# Patient Record
Sex: FEMALE | Race: WHITE | NOT HISPANIC OR LATINO | Employment: OTHER | URBAN - METROPOLITAN AREA
[De-identification: names, ages, dates, MRNs, and addresses within clinical notes are randomized per-mention and may not be internally consistent; named-entity substitution may affect disease eponyms.]

---

## 2023-03-28 DIAGNOSIS — F41.9 ANXIETY: ICD-10-CM

## 2023-03-28 RX ORDER — CLONAZEPAM 0.5 MG/1
1 TABLET ORAL DAILY
COMMUNITY
Start: 2015-05-22 | End: 2023-03-28 | Stop reason: SDUPTHER

## 2023-03-28 RX ORDER — CLONAZEPAM 0.5 MG/1
0.5 TABLET ORAL DAILY
Qty: 7 TABLET | Refills: 0 | Status: SHIPPED | OUTPATIENT
Start: 2023-03-28 | End: 2023-04-07 | Stop reason: SDUPTHER

## 2023-04-06 PROBLEM — R91.8 MULTIPLE PULMONARY NODULES: Status: ACTIVE | Noted: 2023-04-06

## 2023-04-06 PROBLEM — F32.0 DEPRESSION, MAJOR, SINGLE EPISODE, MILD (CMS-HCC): Status: ACTIVE | Noted: 2023-04-06

## 2023-04-06 PROBLEM — L40.9 PSORIASIS: Status: ACTIVE | Noted: 2023-04-06

## 2023-04-06 PROBLEM — M54.17 LUMBOSACRAL NEURITIS: Status: ACTIVE | Noted: 2023-04-06

## 2023-04-06 PROBLEM — K57.32 DIVERTICULITIS OF COLON: Status: ACTIVE | Noted: 2023-04-06

## 2023-04-06 PROBLEM — K63.5 COLON POLYP: Status: ACTIVE | Noted: 2023-04-06

## 2023-04-06 PROBLEM — F41.9 ANXIETY DISORDER: Status: ACTIVE | Noted: 2023-04-06

## 2023-04-06 PROBLEM — J45.901 ASTHMA EXACERBATION (HHS-HCC): Status: ACTIVE | Noted: 2023-04-06

## 2023-04-06 PROBLEM — G43.909 MIGRAINE: Status: ACTIVE | Noted: 2023-04-06

## 2023-04-06 PROBLEM — E78.00 PURE HYPERCHOLESTEROLEMIA: Status: ACTIVE | Noted: 2023-04-06

## 2023-04-06 PROBLEM — K58.9 IBS (IRRITABLE BOWEL SYNDROME): Status: ACTIVE | Noted: 2023-04-06

## 2023-04-06 PROBLEM — I77.71 CAROTID ARTERY DISSECTION (MULTI): Status: ACTIVE | Noted: 2023-04-06

## 2023-04-06 PROBLEM — I77.6 ARTERITIS (CMS-HCC): Status: ACTIVE | Noted: 2023-04-06

## 2023-04-06 PROBLEM — E78.5 HYPERLIPIDEMIA: Status: ACTIVE | Noted: 2023-04-06

## 2023-04-06 PROBLEM — M51.9 LUMBAR DISC DISEASE: Status: ACTIVE | Noted: 2023-04-06

## 2023-04-06 PROBLEM — M54.2 CERVICALGIA: Status: ACTIVE | Noted: 2023-04-06

## 2023-04-06 RX ORDER — NADOLOL 20 MG/1
1 TABLET ORAL DAILY
COMMUNITY
Start: 2015-11-30

## 2023-04-06 RX ORDER — ESTRADIOL 10 UG/1
INSERT VAGINAL
COMMUNITY
Start: 2016-02-05 | End: 2024-05-20 | Stop reason: ALTCHOICE

## 2023-04-06 RX ORDER — FAMOTIDINE 20 MG/1
1 TABLET, FILM COATED ORAL 3 TIMES DAILY
COMMUNITY
Start: 2021-12-01

## 2023-04-06 RX ORDER — TIZANIDINE 2 MG/1
1 TABLET ORAL EVERY 6 HOURS PRN
COMMUNITY
Start: 2016-02-25 | End: 2023-10-12 | Stop reason: ALTCHOICE

## 2023-04-06 RX ORDER — SUMATRIPTAN SUCCINATE 25 MG/1
1 TABLET ORAL
COMMUNITY
Start: 2015-06-11 | End: 2023-10-30

## 2023-04-06 RX ORDER — CLOBETASOL PROPIONATE 0.46 MG/ML
1 SOLUTION TOPICAL 2 TIMES DAILY
COMMUNITY
Start: 2020-09-09

## 2023-04-06 RX ORDER — ASPIRIN 81 MG/1
TABLET ORAL
COMMUNITY
End: 2023-12-07 | Stop reason: ALTCHOICE

## 2023-04-06 RX ORDER — AMITRIPTYLINE HYDROCHLORIDE 10 MG/1
1 TABLET, FILM COATED ORAL DAILY
COMMUNITY
Start: 2022-06-09 | End: 2023-10-12

## 2023-04-06 RX ORDER — PRAVASTATIN SODIUM 20 MG/1
1 TABLET ORAL DAILY
COMMUNITY
Start: 2021-10-04 | End: 2023-07-31

## 2023-04-07 ENCOUNTER — OFFICE VISIT (OUTPATIENT)
Dept: PRIMARY CARE | Facility: CLINIC | Age: 68
End: 2023-04-07
Payer: MEDICARE

## 2023-04-07 VITALS
WEIGHT: 128.4 LBS | SYSTOLIC BLOOD PRESSURE: 118 MMHG | HEART RATE: 100 BPM | BODY MASS INDEX: 21.92 KG/M2 | HEIGHT: 64 IN | RESPIRATION RATE: 18 BRPM | DIASTOLIC BLOOD PRESSURE: 76 MMHG | TEMPERATURE: 97.2 F

## 2023-04-07 DIAGNOSIS — I77.71 CAROTID ARTERY DISSECTION (MULTI): ICD-10-CM

## 2023-04-07 DIAGNOSIS — F32.0 DEPRESSION, MAJOR, SINGLE EPISODE, MILD (CMS-HCC): ICD-10-CM

## 2023-04-07 DIAGNOSIS — F41.9 ANXIETY: ICD-10-CM

## 2023-04-07 DIAGNOSIS — Z79.899 HIGH RISK MEDICATION USE: Primary | ICD-10-CM

## 2023-04-07 PROCEDURE — 1036F TOBACCO NON-USER: CPT | Performed by: FAMILY MEDICINE

## 2023-04-07 PROCEDURE — 1159F MED LIST DOCD IN RCRD: CPT | Performed by: FAMILY MEDICINE

## 2023-04-07 PROCEDURE — 80307 DRUG TEST PRSMV CHEM ANLYZR: CPT

## 2023-04-07 PROCEDURE — 99213 OFFICE O/P EST LOW 20 MIN: CPT | Performed by: FAMILY MEDICINE

## 2023-04-07 PROCEDURE — 80354 DRUG SCREENING FENTANYL: CPT

## 2023-04-07 PROCEDURE — 80368 SEDATIVE HYPNOTICS: CPT

## 2023-04-07 PROCEDURE — 80373 DRUG SCREENING TRAMADOL: CPT

## 2023-04-07 PROCEDURE — 80346 BENZODIAZEPINES1-12: CPT

## 2023-04-07 PROCEDURE — 80358 DRUG SCREENING METHADONE: CPT

## 2023-04-07 PROCEDURE — 80365 DRUG SCREENING OXYCODONE: CPT

## 2023-04-07 PROCEDURE — 80361 OPIATES 1 OR MORE: CPT

## 2023-04-07 RX ORDER — CLONAZEPAM 0.5 MG/1
0.5 TABLET ORAL DAILY
Qty: 30 TABLET | Refills: 0 | Status: SHIPPED | OUTPATIENT
Start: 2023-04-07 | End: 2023-05-11 | Stop reason: SDUPTHER

## 2023-04-07 RX ORDER — SERTRALINE HYDROCHLORIDE 25 MG/1
50 TABLET, FILM COATED ORAL DAILY
COMMUNITY

## 2023-04-07 ASSESSMENT — PATIENT HEALTH QUESTIONNAIRE - PHQ9
5. POOR APPETITE OR OVEREATING: NOT AT ALL
3. TROUBLE FALLING OR STAYING ASLEEP: NOT AT ALL
10. IF YOU CHECKED OFF ANY PROBLEMS, HOW DIFFICULT HAVE THESE PROBLEMS MADE IT FOR YOU TO DO YOUR WORK, TAKE CARE OF THINGS AT HOME, OR GET ALONG WITH OTHER PEOPLE: SOMEWHAT DIFFICULT
4. FEELING TIRED OR HAVING LITTLE ENERGY: MORE THAN HALF THE DAYS
6. FEELING BAD ABOUT YOURSELF - OR THAT YOU ARE A FAILURE OR HAVE LET YOURSELF OR YOUR FAMILY DOWN: NOT AT ALL
9. THOUGHTS THAT YOU WOULD BE BETTER OFF DEAD, OR OF HURTING YOURSELF: NOT AT ALL
SUM OF ALL RESPONSES TO PHQ QUESTIONS 1-9: 7
SUM OF ALL RESPONSES TO PHQ9 QUESTIONS 1 & 2: 5
1. LITTLE INTEREST OR PLEASURE IN DOING THINGS: MORE THAN HALF THE DAYS
8. MOVING OR SPEAKING SO SLOWLY THAT OTHER PEOPLE COULD HAVE NOTICED. OR THE OPPOSITE, BEING SO FIGETY OR RESTLESS THAT YOU HAVE BEEN MOVING AROUND A LOT MORE THAN USUAL: NOT AT ALL
7. TROUBLE CONCENTRATING ON THINGS, SUCH AS READING THE NEWSPAPER OR WATCHING TELEVISION: NOT AT ALL
2. FEELING DOWN, DEPRESSED OR HOPELESS: NEARLY EVERY DAY

## 2023-04-07 NOTE — PROGRESS NOTES
Subjective   Patient ID: Marichuy Guajardo is a 67 y.o. female who presents for Med Refill (Clonazepam last dose 4/6/23  need csa need order for urine tox  pt is currently on clonazepam not current in med list).    Having some abdominal pain saw Hussein   Going to [facility] for functional medicine   started on sertraline 5 days ago from psychiatrist     TALI:  Opal Tyler MD on 4/7/2023  9:24 AM  I have personally reviewed the OARRS report for Marichuy Guajardo. I have considered the risks of abuse, dependence, addiction and diversion    Is the patient prescribed a combination of a benzodiazepine and opioid?  No    Last Urine Drug Screen / ordered today: Yes  Recent Results (from the past 16437 hour(s))   OPIATE/OPIOID/BENZO PRESCRIPTION COMPLIANCE    Collection Time: 04/06/22 12:00 AM   Result Value Ref Range    DRUG SCREEN COMMENT URINE SEE BELOW     Creatine, Urine 164.9 mg/dL    Amphetamine Screen, Urine PRESUMPTIVE NEGATIVE NEGATIVE    Barbiturate Screen, Urine PRESUMPTIVE NEGATIVE NEGATIVE    Cannabinoid Screen, Urine PRESUMPTIVE NEGATIVE NEGATIVE    Cocaine Screen, Urine PRESUMPTIVE NEGATIVE NEGATIVE    PCP Screen, Urine PRESUMPTIVE NEGATIVE NEGATIVE    7-Aminoclonazepam 177 (A) Cutoff <25 ng/mL    Alpha-Hydroxyalprazolam <25 Cutoff <25 ng/mL    Alpha-Hydroxymidazolam <25 Cutoff <25 ng/mL    Alprazolam <25 Cutoff <25 ng/mL    Chlordiazepoxide <25 Cutoff <25 ng/mL    Clonazepam <25 Cutoff <25 ng/mL    Diazepam <25 Cutoff <25 ng/mL    Lorazepam <25 Cutoff <25 ng/mL    Midazolam <25 Cutoff <25 ng/mL    Nordiazepam <25 Cutoff <25 ng/mL    Oxazepam <25 Cutoff <25 ng/mL    Temazepam <25 Cutoff <25 ng/mL    Zolpidem <25 Cutoff <25 ng/mL    Zolpidem Metabolite (ZCA) <25 Cutoff <25 ng/mL    6-Acetylmorphine <25 Cutoff <25 ng/mL    Codeine <50 Cutoff <50 ng/mL    Hydrocodone <25 Cutoff <25 ng/mL    Hydromorphone <25 Cutoff <25 ng/mL    Morphine Urine <50 Cutoff <50 ng/mL    Norhydrocodone <25 Cutoff <25 ng/mL     "Noroxycodone <25 Cutoff <25 ng/mL    Oxycodone <25 Cutoff <25 ng/mL    Oxymorphone <25 Cutoff <25 ng/mL    Tramadol <50 Cutoff <50 ng/mL    O-Desmethyltramadol <50 Cutoff <50 ng/mL    Fentanyl <2.5 Cutoff<2.5 ng/mL    Norfentanyl <2.5 Cutoff<2.5 ng/mL    METHADONE CONFIRMATION,URINE <25 Cutoff <25 ng/mL    EDDP <25 Cutoff <25 ng/mL     Results are as expected.     Controlled Substance Agreement:  Date of the Last Agreement: today  Reviewed Controlled Substance Agreement including but not limited to the benefits, risks, and alternatives to treatment with a Controlled Substance medication(s).    Benzodiazepines:  What is the patient's goal of therapy? anxiety  Is this being achieved with current treatment? yes    MARKUS-7:  No data recorded    Activities of Daily Living:   Is your overall impression that this patient is benefiting (symptom reduction outweighs side effects) from benzodiazepine therapy? Yes     1. Physical Functioning: Better  2. Family Relationship: Better  3. Social Relationship: Better  4. Mood: Better  5. Sleep Patterns: Better  6. Overall Function: Better     Review of Systems    Objective   /76 (BP Location: Right arm, Patient Position: Sitting, BP Cuff Size: Adult)   Pulse 100   Temp 36.2 °C (97.2 °F) (Temporal)   Resp 18   Ht 1.626 m (5' 4\")   Wt 58.2 kg (128 lb 6.4 oz)   BMI 22.04 kg/m²     Physical Exam  Vitals and nursing note reviewed.   Constitutional:       General: She is not in acute distress.     Appearance: She is not ill-appearing.   HENT:      Head: Normocephalic and atraumatic.      Mouth/Throat:      Mouth: Mucous membranes are moist.   Eyes:      Conjunctiva/sclera: Conjunctivae normal.   Cardiovascular:      Rate and Rhythm: Normal rate and regular rhythm.      Heart sounds: Normal heart sounds.   Pulmonary:      Effort: Pulmonary effort is normal.      Breath sounds: Normal breath sounds.   Skin:     General: Skin is warm.   Neurological:      Mental Status: She is " alert.   Psychiatric:         Mood and Affect: Mood normal.         Thought Content: Thought content normal.         Judgment: Judgment normal.         Assessment/Plan   Problem List Items Addressed This Visit          Circulatory    Carotid artery dissection (CMS/HCC)     Stable and will monitor at least yearly.             Other    Depression, major, single episode, mild (CMS/HCC)     Started on sertraline by psych          Other Visit Diagnoses       High risk medication use    -  Primary    Relevant Orders    OPIATE/OPIOID/BENZO PRESCRIPTION COMPLIANCE    Anxiety        Relevant Medications    clonazePAM (KlonoPIN) 0.5 mg tablet    Other Relevant Orders    Follow Up In [facility] - PCP

## 2023-04-11 LAB
6-ACETYLMORPHINE: <25 NG/ML
7-AMINOCLONAZEPAM: 394 NG/ML
ALPHA-HYDROXYALPRAZOLAM: <25 NG/ML
ALPHA-HYDROXYMIDAZOLAM: <25 NG/ML
ALPRAZOLAM: <25 NG/ML
AMPHETAMINE (PRESENCE) IN URINE BY SCREEN METHOD: ABNORMAL
BARBITURATES PRESENCE IN URINE BY SCREEN METHOD: ABNORMAL
CANNABINOIDS IN URINE BY SCREEN METHOD: ABNORMAL
CHLORDIAZEPOXIDE: <25 NG/ML
CLONAZEPAM: <25 NG/ML
COCAINE (PRESENCE) IN URINE BY SCREEN METHOD: ABNORMAL
CODEINE: <50 NG/ML
CREATINE, URINE FOR DRUG: 286.5 MG/DL
DIAZEPAM: <25 NG/ML
DRUG SCREEN COMMENT URINE: ABNORMAL
EDDP: <25 NG/ML
FENTANYL CONFIRMATION, URINE: <2.5 NG/ML
HYDROCODONE: <25 NG/ML
HYDROMORPHONE: <25 NG/ML
LORAZEPAM: <25 NG/ML
METHADONE CONFIRMATION,URINE: <25 NG/ML
MIDAZOLAM: <25 NG/ML
MORPHINE URINE: <50 NG/ML
NORDIAZEPAM: <25 NG/ML
NORFENTANYL: <2.5 NG/ML
NORHYDROCODONE: <25 NG/ML
NOROXYCODONE: <25 NG/ML
O-DESMETHYLTRAMADOL: <50 NG/ML
OXAZEPAM: <25 NG/ML
OXYCODONE: <25 NG/ML
OXYMORPHONE: <25 NG/ML
PHENCYCLIDINE (PRESENCE) IN URINE BY SCREEN METHOD: ABNORMAL
TEMAZEPAM: <25 NG/ML
TRAMADOL: <50 NG/ML
ZOLPIDEM METABOLITE (ZCA): <25 NG/ML
ZOLPIDEM: <25 NG/ML

## 2023-05-11 DIAGNOSIS — F41.9 ANXIETY: ICD-10-CM

## 2023-05-11 RX ORDER — CLONAZEPAM 0.5 MG/1
0.5 TABLET ORAL DAILY
Qty: 30 TABLET | Refills: 0 | Status: SHIPPED | OUTPATIENT
Start: 2023-05-11 | End: 2023-06-23

## 2023-06-23 DIAGNOSIS — F41.9 ANXIETY: ICD-10-CM

## 2023-06-23 RX ORDER — CLONAZEPAM 0.5 MG/1
TABLET ORAL
Qty: 30 TABLET | Refills: 0 | Status: SHIPPED | OUTPATIENT
Start: 2023-06-23 | End: 2023-07-28 | Stop reason: SDUPTHER

## 2023-07-11 ENCOUNTER — OFFICE VISIT (OUTPATIENT)
Dept: PRIMARY CARE | Facility: CLINIC | Age: 68
End: 2023-07-11
Payer: MEDICARE

## 2023-07-11 VITALS
BODY MASS INDEX: 22.23 KG/M2 | HEIGHT: 64 IN | SYSTOLIC BLOOD PRESSURE: 122 MMHG | WEIGHT: 130.2 LBS | RESPIRATION RATE: 16 BRPM | DIASTOLIC BLOOD PRESSURE: 72 MMHG | HEART RATE: 74 BPM | TEMPERATURE: 97.3 F

## 2023-07-11 DIAGNOSIS — F41.9 ANXIETY: ICD-10-CM

## 2023-07-11 DIAGNOSIS — E78.5 HYPERLIPIDEMIA, UNSPECIFIED HYPERLIPIDEMIA TYPE: Primary | ICD-10-CM

## 2023-07-11 PROCEDURE — 1160F RVW MEDS BY RX/DR IN RCRD: CPT | Performed by: FAMILY MEDICINE

## 2023-07-11 PROCEDURE — 1036F TOBACCO NON-USER: CPT | Performed by: FAMILY MEDICINE

## 2023-07-11 PROCEDURE — 1126F AMNT PAIN NOTED NONE PRSNT: CPT | Performed by: FAMILY MEDICINE

## 2023-07-11 PROCEDURE — 99213 OFFICE O/P EST LOW 20 MIN: CPT | Performed by: FAMILY MEDICINE

## 2023-07-11 PROCEDURE — 1159F MED LIST DOCD IN RCRD: CPT | Performed by: FAMILY MEDICINE

## 2023-07-11 ASSESSMENT — ANXIETY QUESTIONNAIRES
1. FEELING NERVOUS, ANXIOUS, OR ON EDGE: MORE THAN HALF THE DAYS
6. BECOMING EASILY ANNOYED OR IRRITABLE: NOT AT ALL
3. WORRYING TOO MUCH ABOUT DIFFERENT THINGS: SEVERAL DAYS
5. BEING SO RESTLESS THAT IT IS HARD TO SIT STILL: NOT AT ALL
7. FEELING AFRAID AS IF SOMETHING AWFUL MIGHT HAPPEN: MORE THAN HALF THE DAYS
4. TROUBLE RELAXING: MORE THAN HALF THE DAYS
GAD7 TOTAL SCORE: 9
IF YOU CHECKED OFF ANY PROBLEMS ON THIS QUESTIONNAIRE, HOW DIFFICULT HAVE THESE PROBLEMS MADE IT FOR YOU TO DO YOUR WORK, TAKE CARE OF THINGS AT HOME, OR GET ALONG WITH OTHER PEOPLE: SOMEWHAT DIFFICULT
2. NOT BEING ABLE TO STOP OR CONTROL WORRYING: MORE THAN HALF THE DAYS

## 2023-07-11 NOTE — PROGRESS NOTES
Subjective   Patient ID: Marichuy Guajardo is a 67 y.o. female who presents for Follow-up (Patient here for 3 month med check for Clonazepam Last Tox was 4/2023. Last dose of Clonazepam was last night 7/10/23 ).    Seeing a psychiatrist for sertraline   Miralax helping with constipation.  Seeing neurology for migraines   [facility] in March    [facility]  Opal Tyler MD on 7/11/2023 10:08 AM  I have personally reviewed the OARRS report for Marichuy Guajardo. I have considered the risks of abuse, dependence, addiction and diversion    Is the patient prescribed a combination of a benzodiazepine and opioid?  No    Last Urine Drug Screen / ordered today: No  Recent Results (from the past 02611 hour(s))   OPIATE/OPIOID/BENZO PRESCRIPTION COMPLIANCE    Collection Time: 04/07/23 11:21 AM   Result Value Ref Range    DRUG SCREEN COMMENT URINE SEE BELOW     Creatine, Urine 286.5 mg/dL    Amphetamine Screen, Urine PRESUMPTIVE NEGATIVE NEGATIVE    Barbiturate Screen, Urine PRESUMPTIVE NEGATIVE NEGATIVE    Cannabinoid Screen, Urine PRESUMPTIVE NEGATIVE NEGATIVE    Cocaine Screen, Urine PRESUMPTIVE NEGATIVE NEGATIVE    PCP Screen, Urine PRESUMPTIVE NEGATIVE NEGATIVE    7-Aminoclonazepam 394 (A) Cutoff <25 ng/mL    Alpha-Hydroxyalprazolam <25 Cutoff <25 ng/mL    Alpha-Hydroxymidazolam <25 Cutoff <25 ng/mL    Alprazolam <25 Cutoff <25 ng/mL    Chlordiazepoxide <25 Cutoff <25 ng/mL    Clonazepam <25 Cutoff <25 ng/mL    Diazepam <25 Cutoff <25 ng/mL    Lorazepam <25 Cutoff <25 ng/mL    Midazolam <25 Cutoff <25 ng/mL    Nordiazepam <25 Cutoff <25 ng/mL    Oxazepam <25 Cutoff <25 ng/mL    Temazepam <25 Cutoff <25 ng/mL    Zolpidem <25 Cutoff <25 ng/mL    Zolpidem Metabolite (ZCA) <25 Cutoff <25 ng/mL    6-Acetylmorphine <25 Cutoff <25 ng/mL    Codeine <50 Cutoff <50 ng/mL    Hydrocodone <25 Cutoff <25 ng/mL    Hydromorphone <25 Cutoff <25 ng/mL    Morphine Urine <50 Cutoff <50 ng/mL    Norhydrocodone <25 Cutoff <25  "ng/mL    Noroxycodone <25 Cutoff <25 ng/mL    Oxycodone <25 Cutoff <25 ng/mL    Oxymorphone <25 Cutoff <25 ng/mL    Tramadol <50 Cutoff <50 ng/mL    O-Desmethyltramadol <50 Cutoff <50 ng/mL    Fentanyl <2.5 Cutoff<2.5 ng/mL    Norfentanyl <2.5 Cutoff<2.5 ng/mL    METHADONE CONFIRMATION,URINE <25 Cutoff <25 ng/mL    EDDP <25 Cutoff <25 ng/mL     Results are as expected.     Controlled Substance Agreement:  Date of the Last Agreement: today  Reviewed Controlled Substance Agreement including but not limited to the benefits, risks, and alternatives to treatment with a Controlled Substance medication(s).    Benzodiazepines:  What is the patient's goal of therapy? Sleep/anxiety  Is this being achieved with current treatment? yes    MARKUS-7:  Over the last 2 weeks, how often have you been bothered by any of the following problems?  Feeling nervous, anxious, or on edge: 2  Not being able to stop or control worryin  Worrying too much about different things: 1  Trouble relaxin  Being so restless that it is hard to sit still: 0  Becoming easily annoyed or irritable: 0  Feeling afraid as if something awful might happen: 2  MARKUS-7 Total Score: 9        Activities of Daily Living:   Is your overall impression that this patient is benefiting (symptom reduction outweighs side effects) from benzodiazepine therapy? Yes     1. Physical Functioning: Better  2. Family Relationship: Better  3. Social Relationship: Better  4. Mood: Better  5. Sleep Patterns: Better  6. Overall Function: Better     Review of Systems    Objective   /72   Pulse 74   Temp 36.3 °C (97.3 °F)   Resp 16   Ht 1.626 m (5' 4\")   Wt 59.1 kg (130 lb 3.2 oz)   BMI 22.35 kg/m²     Physical Exam  Vitals and nursing note reviewed.   Constitutional:       General: She is not in acute distress.     Appearance: She is not ill-appearing.   HENT:      Head: Normocephalic and atraumatic.      Mouth/Throat:      Mouth: Mucous membranes are moist.   Eyes:      " Conjunctiva/sclera: Conjunctivae normal.   Cardiovascular:      Rate and Rhythm: Normal rate and regular rhythm.      Heart sounds: Normal heart sounds.   Pulmonary:      Effort: Pulmonary effort is normal.      Breath sounds: Normal breath sounds.   Skin:     General: Skin is warm.   Neurological:      Mental Status: She is alert.   Psychiatric:         Mood and Affect: Mood normal.         Thought Content: Thought content normal.         Judgment: Judgment normal.         Assessment/Plan   Problem List Items Addressed This Visit       Hyperlipidemia - Primary    Relevant Orders    Lipid Panel    Hepatic Function Panel     Other Visit Diagnoses       Anxiety        Relevant Orders    Follow Up In [facility] - PCP - Established

## 2023-07-17 ENCOUNTER — LAB (OUTPATIENT)
Dept: LAB | Facility: LAB | Age: 68
End: 2023-07-17
Payer: MEDICARE

## 2023-07-17 DIAGNOSIS — E78.5 HYPERLIPIDEMIA, UNSPECIFIED HYPERLIPIDEMIA TYPE: ICD-10-CM

## 2023-07-17 LAB
ALANINE AMINOTRANSFERASE (SGPT) (U/L) IN SER/PLAS: 25 U/L (ref 7–45)
ALBUMIN (G/DL) IN SER/PLAS: 4.1 G/DL (ref 3.4–5)
ALKALINE PHOSPHATASE (U/L) IN SER/PLAS: 41 U/L (ref 33–136)
ASPARTATE AMINOTRANSFERASE (SGOT) (U/L) IN SER/PLAS: 22 U/L (ref 9–39)
BILIRUBIN DIRECT (MG/DL) IN SER/PLAS: 0.1 MG/DL (ref 0–0.3)
BILIRUBIN TOTAL (MG/DL) IN SER/PLAS: 0.4 MG/DL (ref 0–1.2)
CHOLESTEROL (MG/DL) IN SER/PLAS: 225 MG/DL (ref 0–199)
CHOLESTEROL IN HDL (MG/DL) IN SER/PLAS: 51.7 MG/DL
CHOLESTEROL/HDL RATIO: 4.4
LDL: 154 MG/DL (ref 0–99)
PROTEIN TOTAL: 6.7 G/DL (ref 6.4–8.2)
TRIGLYCERIDE (MG/DL) IN SER/PLAS: 99 MG/DL (ref 0–149)
VLDL: 20 MG/DL (ref 0–40)

## 2023-07-17 PROCEDURE — 80076 HEPATIC FUNCTION PANEL: CPT

## 2023-07-17 PROCEDURE — 80061 LIPID PANEL: CPT

## 2023-07-17 PROCEDURE — 36415 COLL VENOUS BLD VENIPUNCTURE: CPT

## 2023-07-28 DIAGNOSIS — F41.9 ANXIETY: ICD-10-CM

## 2023-07-28 RX ORDER — CLONAZEPAM 0.5 MG/1
0.5 TABLET ORAL DAILY
Qty: 30 TABLET | Refills: 0 | Status: SHIPPED | OUTPATIENT
Start: 2023-07-28 | End: 2023-09-18 | Stop reason: SDUPTHER

## 2023-07-29 DIAGNOSIS — E78.5 HYPERLIPIDEMIA, UNSPECIFIED HYPERLIPIDEMIA TYPE: Primary | ICD-10-CM

## 2023-07-31 RX ORDER — PRAVASTATIN SODIUM 20 MG/1
20 TABLET ORAL DAILY
Qty: 90 TABLET | Refills: 0 | Status: SHIPPED | OUTPATIENT
Start: 2023-07-31 | End: 2023-10-12 | Stop reason: ALTCHOICE

## 2023-08-31 ENCOUNTER — OFFICE VISIT (OUTPATIENT)
Dept: PRIMARY CARE | Facility: CLINIC | Age: 68
End: 2023-08-31
Payer: MEDICARE

## 2023-08-31 VITALS
RESPIRATION RATE: 16 BRPM | SYSTOLIC BLOOD PRESSURE: 118 MMHG | HEART RATE: 72 BPM | BODY MASS INDEX: 22.91 KG/M2 | HEIGHT: 64 IN | TEMPERATURE: 97.5 F | DIASTOLIC BLOOD PRESSURE: 72 MMHG | WEIGHT: 134.2 LBS

## 2023-08-31 DIAGNOSIS — J06.9 UPPER RESPIRATORY TRACT INFECTION, UNSPECIFIED TYPE: ICD-10-CM

## 2023-08-31 DIAGNOSIS — J02.9 SORE THROAT: Primary | ICD-10-CM

## 2023-08-31 LAB — POC RAPID STREP: NEGATIVE

## 2023-08-31 PROCEDURE — 87880 STREP A ASSAY W/OPTIC: CPT | Performed by: FAMILY MEDICINE

## 2023-08-31 PROCEDURE — 1159F MED LIST DOCD IN RCRD: CPT | Performed by: FAMILY MEDICINE

## 2023-08-31 PROCEDURE — 1126F AMNT PAIN NOTED NONE PRSNT: CPT | Performed by: FAMILY MEDICINE

## 2023-08-31 PROCEDURE — 99212 OFFICE O/P EST SF 10 MIN: CPT | Performed by: FAMILY MEDICINE

## 2023-08-31 PROCEDURE — 1036F TOBACCO NON-USER: CPT | Performed by: FAMILY MEDICINE

## 2023-08-31 PROCEDURE — 1160F RVW MEDS BY RX/DR IN RCRD: CPT | Performed by: FAMILY MEDICINE

## 2023-08-31 ASSESSMENT — ENCOUNTER SYMPTOMS
ABDOMINAL PAIN: 0
COUGH: 1
SORE THROAT: 1

## 2023-08-31 NOTE — PROGRESS NOTES
"Subjective   Patient ID: Marichuy Guajardo is a 67 y.o. female who presents for Sore Throat (Scratchy throat when she swallows. Some nasal congestion no color in mucus. ) and Earache (Symptoms started yesterday. Left ear pain ).    Pain left ear when swallow    slightly nasal congestion   Covid negative     Sore Throat   This is a new problem. The current episode started yesterday. The problem has been waxing and waning. There has been no fever. Associated symptoms include congestion, coughing (slight) and ear pain. Pertinent negatives include no abdominal pain.        Review of Systems   HENT:  Positive for congestion, ear pain and sore throat.    Respiratory:  Positive for cough (slight).    Gastrointestinal:  Negative for abdominal pain.       Objective   /72   Pulse 72   Temp 36.4 °C (97.5 °F)   Resp 16   Ht 1.626 m (5' 4\")   Wt 60.9 kg (134 lb 3.2 oz)   BMI 23.04 kg/m²     Physical Exam  Vitals and nursing note reviewed.   Constitutional:       General: She is not in acute distress.     Appearance: Normal appearance. She is not ill-appearing.   HENT:      Head: Normocephalic and atraumatic.      Right Ear: Tympanic membrane, ear canal and external ear normal. Tympanic membrane is not perforated, erythematous, retracted or bulging.      Left Ear: Tympanic membrane, ear canal and external ear normal. Tympanic membrane is not perforated, erythematous, retracted or bulging.      Mouth/Throat:      Mouth: Mucous membranes are moist.      Pharynx: Oropharynx is clear.   Eyes:      General:         Right eye: No hordeolum.         Left eye: No hordeolum.      Conjunctiva/sclera: Conjunctivae normal.      Right eye: Right conjunctiva is not injected. No exudate.     Left eye: Left conjunctiva is not injected. No exudate.  Cardiovascular:      Rate and Rhythm: Normal rate and regular rhythm.   Pulmonary:      Effort: Pulmonary effort is normal. No tachypnea.      Breath sounds: Normal breath sounds. No " decreased air movement.   Lymphadenopathy:      Cervical:      Right cervical: No superficial cervical adenopathy.     Left cervical: No superficial cervical adenopathy.   Skin:     General: Skin is warm.      Findings: No rash.   Neurological:      Mental Status: She is alert.   Psychiatric:         Mood and Affect: Mood and affect normal.         Assessment/Plan   Problem List Items Addressed This Visit    None  Visit Diagnoses       Sore throat    -  Primary    Relevant Orders    POCT rapid strep A manually resulted (Completed)    Upper respiratory tract infection, unspecified type

## 2023-09-18 DIAGNOSIS — F41.9 ANXIETY: ICD-10-CM

## 2023-09-18 RX ORDER — CLONAZEPAM 0.5 MG/1
0.5 TABLET ORAL DAILY
Qty: 30 TABLET | Refills: 0 | Status: SHIPPED | OUTPATIENT
Start: 2023-09-18 | End: 2023-10-12 | Stop reason: SDUPTHER

## 2023-10-12 ENCOUNTER — OFFICE VISIT (OUTPATIENT)
Dept: PRIMARY CARE | Facility: CLINIC | Age: 68
End: 2023-10-12
Payer: MEDICARE

## 2023-10-12 VITALS
SYSTOLIC BLOOD PRESSURE: 124 MMHG | RESPIRATION RATE: 16 BRPM | HEART RATE: 76 BPM | TEMPERATURE: 97.8 F | BODY MASS INDEX: 22.88 KG/M2 | HEIGHT: 64 IN | DIASTOLIC BLOOD PRESSURE: 76 MMHG | WEIGHT: 134 LBS

## 2023-10-12 DIAGNOSIS — Z23 IMMUNIZATION DUE: ICD-10-CM

## 2023-10-12 DIAGNOSIS — F41.9 ANXIETY: ICD-10-CM

## 2023-10-12 DIAGNOSIS — G43.909 MIGRAINE WITHOUT STATUS MIGRAINOSUS, NOT INTRACTABLE, UNSPECIFIED MIGRAINE TYPE: Primary | ICD-10-CM

## 2023-10-12 DIAGNOSIS — R53.83 FATIGUE, UNSPECIFIED TYPE: ICD-10-CM

## 2023-10-12 DIAGNOSIS — G47.19 EXCESSIVE DAYTIME SLEEPINESS: ICD-10-CM

## 2023-10-12 PROCEDURE — 99214 OFFICE O/P EST MOD 30 MIN: CPT | Performed by: FAMILY MEDICINE

## 2023-10-12 PROCEDURE — 1160F RVW MEDS BY RX/DR IN RCRD: CPT | Performed by: FAMILY MEDICINE

## 2023-10-12 PROCEDURE — 90677 PCV20 VACCINE IM: CPT | Performed by: FAMILY MEDICINE

## 2023-10-12 PROCEDURE — 1159F MED LIST DOCD IN RCRD: CPT | Performed by: FAMILY MEDICINE

## 2023-10-12 PROCEDURE — G0009 ADMIN PNEUMOCOCCAL VACCINE: HCPCS | Performed by: FAMILY MEDICINE

## 2023-10-12 PROCEDURE — 1036F TOBACCO NON-USER: CPT | Performed by: FAMILY MEDICINE

## 2023-10-12 PROCEDURE — 1126F AMNT PAIN NOTED NONE PRSNT: CPT | Performed by: FAMILY MEDICINE

## 2023-10-12 RX ORDER — NORTRIPTYLINE HYDROCHLORIDE 10 MG/1
10 CAPSULE ORAL NIGHTLY
Qty: 30 CAPSULE | Refills: 2 | Status: SHIPPED | OUTPATIENT
Start: 2023-10-12 | End: 2023-12-07 | Stop reason: ALTCHOICE

## 2023-10-12 RX ORDER — CLONAZEPAM 0.5 MG/1
0.5 TABLET ORAL DAILY
Qty: 30 TABLET | Refills: 0 | Status: SHIPPED | OUTPATIENT
Start: 2023-10-12 | End: 2023-11-28 | Stop reason: SDUPTHER

## 2023-10-12 RX ORDER — ATORVASTATIN CALCIUM 40 MG/1
1 TABLET, FILM COATED ORAL DAILY
COMMUNITY
Start: 2023-09-13 | End: 2023-11-07 | Stop reason: SDUPTHER

## 2023-10-12 ASSESSMENT — ANXIETY QUESTIONNAIRES
IF YOU CHECKED OFF ANY PROBLEMS ON THIS QUESTIONNAIRE, HOW DIFFICULT HAVE THESE PROBLEMS MADE IT FOR YOU TO DO YOUR WORK, TAKE CARE OF THINGS AT HOME, OR GET ALONG WITH OTHER PEOPLE: NOT DIFFICULT AT ALL
3. WORRYING TOO MUCH ABOUT DIFFERENT THINGS: NOT AT ALL
2. NOT BEING ABLE TO STOP OR CONTROL WORRYING: MORE THAN HALF THE DAYS
1. FEELING NERVOUS, ANXIOUS, OR ON EDGE: NOT AT ALL
7. FEELING AFRAID AS IF SOMETHING AWFUL MIGHT HAPPEN: NOT AT ALL
6. BECOMING EASILY ANNOYED OR IRRITABLE: NOT AT ALL
GAD7 TOTAL SCORE: 2
5. BEING SO RESTLESS THAT IT IS HARD TO SIT STILL: NOT AT ALL
4. TROUBLE RELAXING: NOT AT ALL

## 2023-10-12 NOTE — PROGRESS NOTES
OARRS:  Opal Tyler MD on 10/12/2023 10:34 AM  I have personally reviewed the OARRS report for Marichuy Guajardo. I have considered the risks of abuse, dependence, addiction and diversion    Is the patient prescribed a combination of a benzodiazepine and opioid?  No    Last Urine Drug Screen / ordered today: No  Recent Results (from the past 8760 hour(s))   OPIATE/OPIOID/BENZO PRESCRIPTION COMPLIANCE    Collection Time: 04/07/23 11:21 AM   Result Value Ref Range    DRUG SCREEN COMMENT URINE SEE BELOW     Creatine, Urine 286.5 mg/dL    Amphetamine Screen, Urine PRESUMPTIVE NEGATIVE NEGATIVE    Barbiturate Screen, Urine PRESUMPTIVE NEGATIVE NEGATIVE    Cannabinoid Screen, Urine PRESUMPTIVE NEGATIVE NEGATIVE    Cocaine Screen, Urine PRESUMPTIVE NEGATIVE NEGATIVE    PCP Screen, Urine PRESUMPTIVE NEGATIVE NEGATIVE    7-Aminoclonazepam 394 (A) Cutoff <25 ng/mL    Alpha-Hydroxyalprazolam <25 Cutoff <25 ng/mL    Alpha-Hydroxymidazolam <25 Cutoff <25 ng/mL    Alprazolam <25 Cutoff <25 ng/mL    Chlordiazepoxide <25 Cutoff <25 ng/mL    Clonazepam <25 Cutoff <25 ng/mL    Diazepam <25 Cutoff <25 ng/mL    Lorazepam <25 Cutoff <25 ng/mL    Midazolam <25 Cutoff <25 ng/mL    Nordiazepam <25 Cutoff <25 ng/mL    Oxazepam <25 Cutoff <25 ng/mL    Temazepam <25 Cutoff <25 ng/mL    Zolpidem <25 Cutoff <25 ng/mL    Zolpidem Metabolite (ZCA) <25 Cutoff <25 ng/mL    6-Acetylmorphine <25 Cutoff <25 ng/mL    Codeine <50 Cutoff <50 ng/mL    Hydrocodone <25 Cutoff <25 ng/mL    Hydromorphone <25 Cutoff <25 ng/mL    Morphine Urine <50 Cutoff <50 ng/mL    Norhydrocodone <25 Cutoff <25 ng/mL    Noroxycodone <25 Cutoff <25 ng/mL    Oxycodone <25 Cutoff <25 ng/mL    Oxymorphone <25 Cutoff <25 ng/mL    Tramadol <50 Cutoff <50 ng/mL    O-Desmethyltramadol <50 Cutoff <50 ng/mL    Fentanyl <2.5 Cutoff<2.5 ng/mL    Norfentanyl <2.5 Cutoff<2.5 ng/mL    METHADONE CONFIRMATION,URINE <25 Cutoff <25 ng/mL    EDDP <25 Cutoff <25 ng/mL     Results are as  "expected.         Controlled Substance Agreement:  Date of the Last Agreement: 2023  Reviewed Controlled Substance Agreement including but not limited to the benefits, risks, and alternatives to treatment with a Controlled Substance medication(s).    Benzodiazepines:  What is the patient's goal of therapy? Anxiety   Is this being achieved with current treatment? yes    MARKUS-7:  Over the last 2 weeks, how often have you been bothered by any of the following problems?  Feeling nervous, anxious, or on edge: 0  Not being able to stop or control worryin  Worrying too much about different things: 0  Trouble relaxin  Being so restless that it is hard to sit still: 0  Becoming easily annoyed or irritable: 0  Feeling afraid as if something awful might happen: 0  MARKUS-7 Total Score: 2        Activities of Daily Living:   Is your overall impression that this patient is benefiting (symptom reduction outweighs side effects) from benzodiazepine therapy? Yes     1. Physical Functioning: Better  2. Family Relationship: Better  3. Social Relationship: Better  4. Mood: Better  5. Sleep Patterns: Better  6. Overall Function: BetterSubjective   Patient ID: Marichuy Guajardo is a 67 y.o. female who presents for Med Refill (Patient here for 3 month med check for Klonopin Last Tox screen and CSA was done 23  ) and Migraine (Daily Migraines for awhile now has been taking her Imitrex daily ).    Stopped Imitrex  and had headache for 2 days and IBS   Been more tired recently   snores  has excessive daytime sleepiness   Wants to try nortytyline for migraine prevention          Review of Systems    Objective   /76   Pulse 76   Temp 36.6 °C (97.8 °F)   Resp 16   Ht 1.626 m (5' 4\")   Wt 60.8 kg (134 lb)   BMI 23.00 kg/m²     Physical Exam  Vitals and nursing note reviewed.   Constitutional:       General: She is not in acute distress.     Appearance: She is not ill-appearing.   HENT:      Head: Normocephalic and " atraumatic.      Mouth/Throat:      Mouth: Mucous membranes are moist.   Eyes:      Conjunctiva/sclera: Conjunctivae normal.   Cardiovascular:      Rate and Rhythm: Normal rate and regular rhythm.      Heart sounds: Normal heart sounds.   Pulmonary:      Effort: Pulmonary effort is normal.      Breath sounds: Normal breath sounds.   Skin:     General: Skin is warm.   Neurological:      Mental Status: She is alert.   Psychiatric:         Mood and Affect: Mood normal.         Thought Content: Thought content normal.         Judgment: Judgment normal.         Assessment/Plan   Problem List Items Addressed This Visit             ICD-10-CM    Migraine - Primary G43.909    Relevant Medications    nortriptyline (Pamelor) 10 mg capsule    Other Relevant Orders    Follow Up In [facility] - PCP - Established     Other Visit Diagnoses         Codes    Anxiety     F41.9    Relevant Medications    clonazePAM (KlonoPIN) 0.5 mg tablet    Immunization due     Z23    Relevant Orders    Pneumococcal conjugate vaccine, 20-valent (PREVNAR 20) (Completed)    Fatigue, unspecified type     R53.83    Relevant Orders    CBC    Comprehensive Metabolic Panel    Vitamin B12    Thyroid Stimulating Hormone    Excessive daytime sleepiness     G47.19    Relevant Orders    Home sleep apnea test (HSAT)

## 2023-10-17 ENCOUNTER — LAB (OUTPATIENT)
Dept: LAB | Facility: LAB | Age: 68
End: 2023-10-17
Payer: MEDICARE

## 2023-10-17 DIAGNOSIS — R53.83 FATIGUE, UNSPECIFIED TYPE: ICD-10-CM

## 2023-10-17 LAB
ALBUMIN SERPL BCP-MCNC: 4.2 G/DL (ref 3.4–5)
ALP SERPL-CCNC: 43 U/L (ref 33–136)
ALT SERPL W P-5'-P-CCNC: 17 U/L (ref 7–45)
ANION GAP SERPL CALC-SCNC: 10 MMOL/L (ref 10–20)
AST SERPL W P-5'-P-CCNC: 18 U/L (ref 9–39)
BILIRUB SERPL-MCNC: 0.3 MG/DL (ref 0–1.2)
BUN SERPL-MCNC: 18 MG/DL (ref 6–23)
CALCIUM SERPL-MCNC: 9.6 MG/DL (ref 8.6–10.3)
CHLORIDE SERPL-SCNC: 104 MMOL/L (ref 98–107)
CO2 SERPL-SCNC: 30 MMOL/L (ref 21–32)
CREAT SERPL-MCNC: 0.89 MG/DL (ref 0.5–1.05)
ERYTHROCYTE [DISTWIDTH] IN BLOOD BY AUTOMATED COUNT: 12.7 % (ref 11.5–14.5)
GFR SERPL CREATININE-BSD FRML MDRD: 72 ML/MIN/1.73M*2
GLUCOSE SERPL-MCNC: 90 MG/DL (ref 74–99)
HCT VFR BLD AUTO: 41.9 % (ref 36–46)
HGB BLD-MCNC: 13.2 G/DL (ref 12–16)
MCH RBC QN AUTO: 29.1 PG (ref 26–34)
MCHC RBC AUTO-ENTMCNC: 31.5 G/DL (ref 32–36)
MCV RBC AUTO: 93 FL (ref 80–100)
NRBC BLD-RTO: 0 /100 WBCS (ref 0–0)
PLATELET # BLD AUTO: 283 X10*3/UL (ref 150–450)
PMV BLD AUTO: 10.7 FL (ref 7.5–11.5)
POTASSIUM SERPL-SCNC: 4.3 MMOL/L (ref 3.5–5.3)
PROT SERPL-MCNC: 7 G/DL (ref 6.4–8.2)
RBC # BLD AUTO: 4.53 X10*6/UL (ref 4–5.2)
SODIUM SERPL-SCNC: 140 MMOL/L (ref 136–145)
TSH SERPL-ACNC: 1.74 MIU/L (ref 0.44–3.98)
VIT B12 SERPL-MCNC: 524 PG/ML (ref 211–911)
WBC # BLD AUTO: 6 X10*3/UL (ref 4.4–11.3)

## 2023-10-17 PROCEDURE — 85027 COMPLETE CBC AUTOMATED: CPT

## 2023-10-17 PROCEDURE — 84443 ASSAY THYROID STIM HORMONE: CPT

## 2023-10-17 PROCEDURE — 80053 COMPREHEN METABOLIC PANEL: CPT

## 2023-10-17 PROCEDURE — 82607 VITAMIN B-12: CPT

## 2023-10-17 PROCEDURE — 36415 COLL VENOUS BLD VENIPUNCTURE: CPT

## 2023-10-29 DIAGNOSIS — G43.009 MIGRAINE WITHOUT AURA AND WITHOUT STATUS MIGRAINOSUS, NOT INTRACTABLE: Primary | ICD-10-CM

## 2023-10-30 RX ORDER — SUMATRIPTAN SUCCINATE 25 MG/1
TABLET ORAL
Qty: 9 TABLET | Refills: 0 | Status: SHIPPED | OUTPATIENT
Start: 2023-10-30 | End: 2023-11-28 | Stop reason: SDUPTHER

## 2023-11-07 ENCOUNTER — TELEPHONE (OUTPATIENT)
Dept: CARDIOLOGY | Facility: CLINIC | Age: 68
End: 2023-11-07

## 2023-11-07 ENCOUNTER — LAB (OUTPATIENT)
Dept: LAB | Facility: LAB | Age: 68
End: 2023-11-07
Payer: MEDICARE

## 2023-11-07 DIAGNOSIS — E78.5 HYPERLIPIDEMIA, UNSPECIFIED HYPERLIPIDEMIA TYPE: Primary | ICD-10-CM

## 2023-11-07 DIAGNOSIS — E78.5 HYPERLIPIDEMIA, UNSPECIFIED: Primary | ICD-10-CM

## 2023-11-07 LAB
CHOLEST SERPL-MCNC: 151 MG/DL (ref 0–199)
CHOLESTEROL/HDL RATIO: 3.2
HDLC SERPL-MCNC: 46.9 MG/DL
LDLC SERPL CALC-MCNC: 88 MG/DL
NON HDL CHOLESTEROL: 104 MG/DL (ref 0–149)
TRIGL SERPL-MCNC: 82 MG/DL (ref 0–149)
VLDL: 16 MG/DL (ref 0–40)

## 2023-11-07 PROCEDURE — 80061 LIPID PANEL: CPT

## 2023-11-07 PROCEDURE — 36415 COLL VENOUS BLD VENIPUNCTURE: CPT

## 2023-11-07 RX ORDER — ATORVASTATIN CALCIUM 40 MG/1
40 TABLET, FILM COATED ORAL DAILY
Qty: 90 TABLET | Refills: 3 | Status: SHIPPED | OUTPATIENT
Start: 2023-11-07 | End: 2023-12-07 | Stop reason: SINTOL

## 2023-11-07 NOTE — TELEPHONE ENCOUNTER
Marichuy having joint pain, calves throb, only taking atorvastatin 20 mg / lipid panel today is good .  Also having headaches      can stay off for a while ?

## 2023-11-09 ENCOUNTER — CLINICAL SUPPORT (OUTPATIENT)
Dept: SLEEP MEDICINE | Facility: HOSPITAL | Age: 68
End: 2023-11-09
Payer: MEDICARE

## 2023-11-09 DIAGNOSIS — G47.33 OBSTRUCTIVE SLEEP APNEA (ADULT) (PEDIATRIC): ICD-10-CM

## 2023-11-09 DIAGNOSIS — G47.19 EXCESSIVE DAYTIME SLEEPINESS: ICD-10-CM

## 2023-11-09 PROCEDURE — 95806 SLEEP STUDY UNATT&RESP EFFT: CPT | Performed by: SPECIALIST

## 2023-11-09 NOTE — PROGRESS NOTES
Type of Study: HOME SLEEP STUDY - NOMAD     The patient received equipment and instructions for use of the [facility] [facility] Nomad HSAT device. The patient was instructed how to apply the effort belts, cannula, thermistor. It was also explained how the Nomad and oximeter components work.  The patient was asked to record their sleep for an 8-hour period.     The patient was informed of their responsibility for the device and acknowledged this by signing the HSAT device contract. The patient was asked to return the device on 11/10/2023 between the hours of 9am to the [facility].     The patient was instructed to call 911 as usual for any medical- emergencies while at home.  The patient was also given a phone number for troubleshooting when using the device in case there were additional questions.

## 2023-11-13 ENCOUNTER — TELEPHONE (OUTPATIENT)
Dept: CARDIOLOGY | Facility: CLINIC | Age: 68
End: 2023-11-13
Payer: MEDICARE

## 2023-11-13 NOTE — TELEPHONE ENCOUNTER
Mrs. Greer calls back as requested after being off Atorvastatin for about a week.  She reports symptoms have gone away.      Please advise as to what's next, new med? Lower dose? See another doctor as previously mentioned?

## 2023-11-14 ENCOUNTER — TELEPHONE (OUTPATIENT)
Dept: CARDIOLOGY | Facility: CLINIC | Age: 68
End: 2023-11-14
Payer: MEDICARE

## 2023-11-14 NOTE — TELEPHONE ENCOUNTER
----- Message from Josh Velasco MD sent at 11/10/2023  4:11 PM EST -----  Please call the patient and let her know that the blood work looked good.  No changes needed.  Follow-up as scheduled

## 2023-11-15 DIAGNOSIS — G47.33 OSA (OBSTRUCTIVE SLEEP APNEA): Primary | ICD-10-CM

## 2023-11-28 DIAGNOSIS — G43.009 MIGRAINE WITHOUT AURA AND WITHOUT STATUS MIGRAINOSUS, NOT INTRACTABLE: ICD-10-CM

## 2023-11-28 DIAGNOSIS — F41.9 ANXIETY: ICD-10-CM

## 2023-11-28 RX ORDER — SUMATRIPTAN SUCCINATE 25 MG/1
25 TABLET ORAL ONCE AS NEEDED
Qty: 9 TABLET | Refills: 1 | Status: SHIPPED | OUTPATIENT
Start: 2023-11-28 | End: 2023-12-27 | Stop reason: SDUPTHER

## 2023-11-28 RX ORDER — CLONAZEPAM 0.5 MG/1
0.5 TABLET ORAL DAILY
Qty: 30 TABLET | Refills: 0 | Status: SHIPPED | OUTPATIENT
Start: 2023-11-28 | End: 2023-12-07 | Stop reason: SDUPTHER

## 2023-12-01 DIAGNOSIS — B00.1 RECURRENT COLD SORES: Primary | ICD-10-CM

## 2023-12-01 RX ORDER — ACYCLOVIR 800 MG/1
800 TABLET ORAL 3 TIMES DAILY
Qty: 6 TABLET | Refills: 3 | Status: SHIPPED | OUTPATIENT
Start: 2023-12-01 | End: 2023-12-03

## 2023-12-07 ENCOUNTER — OFFICE VISIT (OUTPATIENT)
Dept: PRIMARY CARE | Facility: CLINIC | Age: 68
End: 2023-12-07
Payer: MEDICARE

## 2023-12-07 VITALS
BODY MASS INDEX: 22.94 KG/M2 | HEART RATE: 76 BPM | TEMPERATURE: 97.6 F | HEIGHT: 64 IN | DIASTOLIC BLOOD PRESSURE: 74 MMHG | WEIGHT: 134.4 LBS | SYSTOLIC BLOOD PRESSURE: 122 MMHG | RESPIRATION RATE: 16 BRPM

## 2023-12-07 DIAGNOSIS — F41.9 ANXIETY: ICD-10-CM

## 2023-12-07 DIAGNOSIS — Z00.00 ROUTINE GENERAL MEDICAL EXAMINATION AT HEALTH CARE FACILITY: Primary | ICD-10-CM

## 2023-12-07 PROCEDURE — 1159F MED LIST DOCD IN RCRD: CPT | Performed by: FAMILY MEDICINE

## 2023-12-07 PROCEDURE — 1126F AMNT PAIN NOTED NONE PRSNT: CPT | Performed by: FAMILY MEDICINE

## 2023-12-07 PROCEDURE — G0439 PPPS, SUBSEQ VISIT: HCPCS | Performed by: FAMILY MEDICINE

## 2023-12-07 PROCEDURE — 1036F TOBACCO NON-USER: CPT | Performed by: FAMILY MEDICINE

## 2023-12-07 PROCEDURE — 1160F RVW MEDS BY RX/DR IN RCRD: CPT | Performed by: FAMILY MEDICINE

## 2023-12-07 PROCEDURE — 1170F FXNL STATUS ASSESSED: CPT | Performed by: FAMILY MEDICINE

## 2023-12-07 RX ORDER — CLONAZEPAM 0.5 MG/1
0.5 TABLET ORAL DAILY
Qty: 30 TABLET | Refills: 0 | Status: SHIPPED | OUTPATIENT
Start: 2023-12-07 | End: 2023-12-22 | Stop reason: SDUPTHER

## 2023-12-07 ASSESSMENT — ANXIETY QUESTIONNAIRES
3. WORRYING TOO MUCH ABOUT DIFFERENT THINGS: MORE THAN HALF THE DAYS
2. NOT BEING ABLE TO STOP OR CONTROL WORRYING: MORE THAN HALF THE DAYS
GAD7 TOTAL SCORE: 10
5. BEING SO RESTLESS THAT IT IS HARD TO SIT STILL: NOT AT ALL
4. TROUBLE RELAXING: NOT AT ALL
IF YOU CHECKED OFF ANY PROBLEMS ON THIS QUESTIONNAIRE, HOW DIFFICULT HAVE THESE PROBLEMS MADE IT FOR YOU TO DO YOUR WORK, TAKE CARE OF THINGS AT HOME, OR GET ALONG WITH OTHER PEOPLE: NOT DIFFICULT AT ALL
1. FEELING NERVOUS, ANXIOUS, OR ON EDGE: MORE THAN HALF THE DAYS
7. FEELING AFRAID AS IF SOMETHING AWFUL MIGHT HAPPEN: MORE THAN HALF THE DAYS
6. BECOMING EASILY ANNOYED OR IRRITABLE: MORE THAN HALF THE DAYS

## 2023-12-07 ASSESSMENT — ACTIVITIES OF DAILY LIVING (ADL)
DOING_HOUSEWORK: INDEPENDENT
MANAGING_FINANCES: INDEPENDENT
GROCERY_SHOPPING: INDEPENDENT
BATHING: INDEPENDENT
TAKING_MEDICATION: INDEPENDENT
DRESSING: INDEPENDENT

## 2023-12-07 NOTE — PROGRESS NOTES
Subjective   Reason for Visit: Marichuy Guajardo is an 68 y.o. female here for a Medicare Wellness visit.     Past Medical, Surgical, and Family History reviewed and updated in chart.    Reviewed all medications by prescribing practitioner or clinical pharmacist (such as prescriptions, OTCs, herbal therapies and supplements) and documented in the medical record.    Fatigue is better and does not want to CPAP and feels will not tolerate so will wait.   Leaving for out of town 5-6 weeks end of December   Got covid, RSV and flu     OARRS:  Opal Tyler MD on 12/7/2023  9:14 AM  I have personally reviewed the OARRS report for Marichuy Guajardo. I have considered the risks of abuse, dependence, addiction and diversion    Is the patient prescribed a combination of a benzodiazepine and opioid?  No    Last Urine Drug Screen / ordered today: Yes  Recent Results (from the past 8760 hour(s))   OPIATE/OPIOID/BENZO PRESCRIPTION COMPLIANCE    Collection Time: 04/07/23 11:21 AM   Result Value Ref Range    DRUG SCREEN COMMENT URINE SEE BELOW     Creatine, Urine 286.5 mg/dL    Amphetamine Screen, Urine PRESUMPTIVE NEGATIVE NEGATIVE    Barbiturate Screen, Urine PRESUMPTIVE NEGATIVE NEGATIVE    Cannabinoid Screen, Urine PRESUMPTIVE NEGATIVE NEGATIVE    Cocaine Screen, Urine PRESUMPTIVE NEGATIVE NEGATIVE    PCP Screen, Urine PRESUMPTIVE NEGATIVE NEGATIVE    7-Aminoclonazepam 394 (A) Cutoff <25 ng/mL    Alpha-Hydroxyalprazolam <25 Cutoff <25 ng/mL    Alpha-Hydroxymidazolam <25 Cutoff <25 ng/mL    Alprazolam <25 Cutoff <25 ng/mL    Chlordiazepoxide <25 Cutoff <25 ng/mL    Clonazepam <25 Cutoff <25 ng/mL    Diazepam <25 Cutoff <25 ng/mL    Lorazepam <25 Cutoff <25 ng/mL    Midazolam <25 Cutoff <25 ng/mL    Nordiazepam <25 Cutoff <25 ng/mL    Oxazepam <25 Cutoff <25 ng/mL    Temazepam <25 Cutoff <25 ng/mL    Zolpidem <25 Cutoff <25 ng/mL    Zolpidem Metabolite (ZCA) <25 Cutoff <25 ng/mL    6-Acetylmorphine <25 Cutoff <25 ng/mL     "Codeine <50 Cutoff <50 ng/mL    Hydrocodone <25 Cutoff <25 ng/mL    Hydromorphone <25 Cutoff <25 ng/mL    Morphine Urine <50 Cutoff <50 ng/mL    Norhydrocodone <25 Cutoff <25 ng/mL    Noroxycodone <25 Cutoff <25 ng/mL    Oxycodone <25 Cutoff <25 ng/mL    Oxymorphone <25 Cutoff <25 ng/mL    Tramadol <50 Cutoff <50 ng/mL    O-Desmethyltramadol <50 Cutoff <50 ng/mL    Fentanyl <2.5 Cutoff<2.5 ng/mL    Norfentanyl <2.5 Cutoff<2.5 ng/mL    METHADONE CONFIRMATION,URINE <25 Cutoff <25 ng/mL    EDDP <25 Cutoff <25 ng/mL     Results are as expected.         Controlled Substance Agreement:  Date of the Last Agreement: 2023  Reviewed Controlled Substance Agreement including but not limited to the benefits, risks, and alternatives to treatment with a Controlled Substance medication(s).    Benzodiazepines:  What is the patient's goal of therapy? anxiety  Is this being achieved with current treatment? yes    MARKUS-7:  Over the last 2 weeks, how often have you been bothered by any of the following problems?  Feeling nervous, anxious, or on edge: 2  Not being able to stop or control worryin  Worrying too much about different things: 2  Trouble relaxin  Being so restless that it is hard to sit still: 0  Becoming easily annoyed or irritable: 2  Feeling afraid as if something awful might happen: 2  MARKUS-7 Total Score: 10        Activities of Daily Living:   Is your overall impression that this patient is benefiting (symptom reduction outweighs side effects) from benzodiazepine therapy? Yes     1. Physical Functioning: Better  2. Family Relationship: Better  3. Social Relationship: Better  4. Mood: Better  5. Sleep Patterns: Better  6. Overall Function: Better    Patient Care Team:  Opal Tyler MD as PCP - General (Family Medicine)  Opal Tyler [facility] as PCP [facility]     Review of Systems    Objective   Vitals:  /74   Pulse 76   Temp 36.4 °C (97.6 °F)   Resp 16   Ht 1.626 m (5' 4\")   Wt " 61 kg (134 lb 6.4 oz)   BMI 23.07 kg/m²       Physical Exam  Vitals and nursing note reviewed.   Constitutional:       General: She is not in acute distress.     Appearance: She is not ill-appearing.   HENT:      Head: Normocephalic and atraumatic.      Mouth/Throat:      Mouth: Mucous membranes are moist.   Eyes:      Conjunctiva/sclera: Conjunctivae normal.   Cardiovascular:      Rate and Rhythm: Normal rate and regular rhythm.      Heart sounds: Normal heart sounds.   Pulmonary:      Effort: Pulmonary effort is normal.      Breath sounds: Normal breath sounds.   Skin:     General: Skin is warm.   Neurological:      Mental Status: She is alert.   Psychiatric:         Mood and Affect: Mood normal.         Thought Content: Thought content normal.         Judgment: Judgment normal.         Assessment/Plan   Problem List Items Addressed This Visit    None  Visit Diagnoses       Routine general medical examination at health care facility    -  Primary    Anxiety        Relevant Medications    clonazePAM (KlonoPIN) 0.5 mg tablet

## 2023-12-22 DIAGNOSIS — F41.9 ANXIETY: ICD-10-CM

## 2023-12-22 RX ORDER — CLONAZEPAM 0.5 MG/1
0.5 TABLET ORAL DAILY
Qty: 30 TABLET | Refills: 0 | Status: SHIPPED | OUTPATIENT
Start: 2023-12-22

## 2023-12-27 DIAGNOSIS — G43.009 MIGRAINE WITHOUT AURA AND WITHOUT STATUS MIGRAINOSUS, NOT INTRACTABLE: ICD-10-CM

## 2023-12-27 RX ORDER — SUMATRIPTAN SUCCINATE 25 MG/1
25 TABLET ORAL ONCE AS NEEDED
Qty: 9 TABLET | Refills: 11 | Status: SHIPPED | OUTPATIENT
Start: 2023-12-27 | End: 2024-12-26

## 2024-02-08 ENCOUNTER — OFFICE VISIT (OUTPATIENT)
Dept: CARDIOLOGY | Facility: CLINIC | Age: 69
End: 2024-02-08
Payer: MEDICARE

## 2024-02-08 VITALS
WEIGHT: 133.02 LBS | BODY MASS INDEX: 22.71 KG/M2 | SYSTOLIC BLOOD PRESSURE: 137 MMHG | HEIGHT: 64 IN | HEART RATE: 103 BPM | OXYGEN SATURATION: 94 % | DIASTOLIC BLOOD PRESSURE: 87 MMHG

## 2024-02-08 DIAGNOSIS — I77.71 CAROTID ARTERY DISSECTION (MULTI): ICD-10-CM

## 2024-02-08 DIAGNOSIS — E78.5 HYPERLIPIDEMIA, UNSPECIFIED HYPERLIPIDEMIA TYPE: Primary | ICD-10-CM

## 2024-02-08 PROCEDURE — 1159F MED LIST DOCD IN RCRD: CPT | Performed by: INTERNAL MEDICINE

## 2024-02-08 PROCEDURE — 99204 OFFICE O/P NEW MOD 45 MIN: CPT | Performed by: INTERNAL MEDICINE

## 2024-02-08 PROCEDURE — 1126F AMNT PAIN NOTED NONE PRSNT: CPT | Performed by: INTERNAL MEDICINE

## 2024-02-08 PROCEDURE — 1036F TOBACCO NON-USER: CPT | Performed by: INTERNAL MEDICINE

## 2024-02-08 PROCEDURE — 99214 OFFICE O/P EST MOD 30 MIN: CPT | Performed by: INTERNAL MEDICINE

## 2024-02-08 PROCEDURE — 93010 ELECTROCARDIOGRAM REPORT: CPT | Performed by: INTERNAL MEDICINE

## 2024-02-08 PROCEDURE — 93005 ELECTROCARDIOGRAM TRACING: CPT | Performed by: INTERNAL MEDICINE

## 2024-02-08 PROCEDURE — 1160F RVW MEDS BY RX/DR IN RCRD: CPT | Performed by: INTERNAL MEDICINE

## 2024-02-08 RX ORDER — CETIRIZINE HYDROCHLORIDE 10 MG/1
10 TABLET ORAL DAILY
COMMUNITY

## 2024-02-08 RX ORDER — ATORVASTATIN CALCIUM 20 MG/1
20 TABLET, FILM COATED ORAL DAILY
Qty: 30 TABLET | Refills: 11 | Status: SHIPPED | OUTPATIENT
Start: 2024-02-08 | End: 2025-02-07

## 2024-02-08 RX ORDER — ASPIRIN 325 MG
50 TABLET, DELAYED RELEASE (ENTERIC COATED) ORAL DAILY
Qty: 30 CAPSULE | Refills: 11 | Status: SHIPPED | OUTPATIENT
Start: 2024-02-08 | End: 2025-02-07

## 2024-02-08 ASSESSMENT — ENCOUNTER SYMPTOMS
ENDOCRINE NEGATIVE: 1
CARDIOVASCULAR NEGATIVE: 1
RESPIRATORY NEGATIVE: 1
CONSTITUTIONAL NEGATIVE: 1
GASTROINTESTINAL NEGATIVE: 1
EYES NEGATIVE: 1
NEUROLOGICAL NEGATIVE: 1
MUSCULOSKELETAL NEGATIVE: 1
ALLERGIC/IMMUNOLOGIC NEGATIVE: 1
HEMATOLOGIC/LYMPHATIC NEGATIVE: 1
PSYCHIATRIC NEGATIVE: 1

## 2024-02-08 ASSESSMENT — PAIN SCALES - GENERAL: PAINLEVEL: 0-NO PAIN

## 2024-02-08 NOTE — PROGRESS NOTES
Preventive Cardiology Clinic Note    Reason for Visit: Hyperlipidemia  Referring Clinician: Rod     History of Present Illness: Marichuy Guajardo is a 68 y.o. female with a history of hyperlipidemia and nonspecific musculoskeletal symptoms on statin therapy as well as carotid artery dissection thought to be secondary to fibromuscular dysplasia who is referred by her cardiologist for lipid-lowering therapy discussion.  For many years she was on pravastatin but it did not lower her cholesterol substantially.  She was recently switched to atorvastatin 40 mg in September and repeat lipid panel in November showed significant improvement although she says that she started to have total body pain and joint pain so she stopped the atorvastatin although some of her joint pain has not fully resolved.  She does not have any exertional chest pain, shortness of breath, palpitations, or syncope.  She is a non-smoker.  There is no history of premature CAD in her family.  She is taking vitamin D supplementation.  Of note her  takes rosuvastatin several times a week also to reduce chance of side effects.    Past Medical History:  She has a past medical history of Occlusion and stenosis of unspecified carotid artery and Other specified abnormal immunological findings in serum.    Past Surgical History:  She has a past surgical history that includes Nose surgery (02/25/2016); Other surgical history (09/12/2019); and Other surgical history (09/12/2019).    Social History:  She reports that she has never smoked. She has never used smokeless tobacco. She reports current alcohol use. She reports that she does not use drugs.    Family History:  Family History   Problem Relation Name Age of Onset    Asthma Mother      Other (Cardiac disorder) Mother      Lung cancer Mother      Colon cancer Father      Fibromyalgia Sister      Scoliosis Sister         Allergies:  Ciprofloxacin, Codeine, Statins-hmg-coa reductase inhibitors,  "Sulfa (sulfonamide antibiotics), and Pantoprazole    Outpatient Medications:  Current Outpatient Medications   Medication Instructions    atorvastatin (LIPITOR) 20 mg, oral, Daily    cetirizine (ZYRTEC) 10 mg, oral, Daily    clobetasol (Temovate) 0.05 % external solution 1 Application, topical (top), 2 times daily, APPLY AND GENTLY MASSAGE INTO AFFECTED AREA(S)    clonazePAM (KLONOPIN) 0.5 mg, oral, Daily    co-enzyme Q-10 50 mg, oral, Daily    famotidine (Pepcid) 20 mg tablet 1 tablet, oral, 3 times daily    nadolol (Corgard) 20 mg tablet 1 tablet, oral, Daily, States takes 5 m-10 mg once a week    sertraline (ZOLOFT) 25 mg, oral, Daily    SUMAtriptan (IMITREX) 25 mg, oral, Once as needed, May repeat dose once in 2 hours if no relief.  Do not exceed 2 doses in 24 hours.    Vagifem 10 mcg tablet vaginal tablet vaginal       Review of Systems:  Review of Systems   Constitutional: Negative.   HENT: Negative.     Eyes: Negative.    Cardiovascular: Negative.    Respiratory: Negative.     Endocrine: Negative.    Hematologic/Lymphatic: Negative.    Skin: Negative.    Musculoskeletal: Negative.    Gastrointestinal: Negative.    Genitourinary: Negative.    Neurological: Negative.    Psychiatric/Behavioral: Negative.     Allergic/Immunologic: Negative.        Last Recorded Vitals:  Vitals:    02/08/24 1030   BP: 137/87   BP Location: Left arm   Patient Position: Sitting   Pulse: 103   SpO2: 94%   Weight: 60.3 kg (133 lb 0.3 oz)   Height: 1.626 m (5' 4\")       Physical Examination:  General: Well appearing, well-nourished, in no acute distress.  HEENT: Normocephalic atraumatic, pupils equal and reactive to light, extraocular muscles intact, no conjunctival injection, oropharynx clear without exudates.  Neck: Normal carotid arterial pulses, no arterial bruits, no thyromegaly.  Cardiac: Regular rhythm and normal heart rate.  S1, S2 present and normal.  No murmurs, rubs, or gallops.  PMI is nondisplaced. Jugular venous " "pulsations are normal.  Pulmonary: Normal breath sounds, no increased work of breathing, no wheezes or crackles.  GI: Normal bowel sounds, abdominal aorta not enlarged, no hepatosplenomegaly, no abdominal bruits.  Lower extremities: No cyanosis, clubbing, or edema.  No xanthelasma present. Normal distal pulses.  Skin: Skin intact. No significant rashes or lesions present.  Neuro: Alert and oriented x 3, normal attention and cognition, no focal motor or sensory neurologic deficits.  Psych: Normal affect and mood.  Musculoskeletal: Normal gait normal muscle tone.    Laboratory Studies:  Lab Results   Component Value Date    GLUCOSE 90 10/17/2023    CALCIUM 9.6 10/17/2023     10/17/2023    K 4.3 10/17/2023    CO2 30 10/17/2023     10/17/2023    BUN 18 10/17/2023    CREATININE 0.89 10/17/2023     Lab Results   Component Value Date    ALT 17 10/17/2023    AST 18 10/17/2023    ALKPHOS 43 10/17/2023    BILITOT 0.3 10/17/2023         Lab Results   Component Value Date    CHOL 151 2023    CHOL 225 (H) 2023    CHOL 211 (H) 2022     Lab Results   Component Value Date    HDL 46.9 2023    HDL 51.7 2023    HDL 52.0 2022     Lab Results   Component Value Date    LDLCALC 88 2023     Lab Results   Component Value Date    TRIG 82 2023    TRIG 99 2023    TRIG 121 2022     No components found for: \"CHOLHDL\"  Lab Results   Component Value Date    HGBA1C 5.7 2020     No components found for: \"UACR\"    Cardiology Tests:  EC2024  ECG in the office today shows normal sinus rhythm, ventricular rate 87 bpm, nonspecific T wave abnormality.      Assessment and Plan:  Problem List Items Addressed This Visit          Cardiac and Vasculature    Carotid artery dissection (CMS/HCC)    Hyperlipidemia - Primary    Relevant Medications    atorvastatin (Lipitor) 20 mg tablet    co-enzyme Q-10 50 mg capsule    Other Relevant Orders    ECG 12 Lead    Lipid Panel     Marichuy " JIMENEZ Guajardo is a 68 y.o. female with a history of hyperlipidemia and nonspecific musculoskeletal symptoms on statin therapy as well as carotid artery dissection thought to be secondary to fibromuscular dysplasia who is referred by her cardiologist for lipid-lowering therapy discussion.  We discussed several options for lipid-lowering therapy including both statin and nonstatin alternatives.  We also discussed using coenzyme Q 10 to assist with potential statin related myalgias.  After discussion of options she would like to try a lower dose of atorvastatin 20 mg in addition to coenzyme Q 10 with reassessment of her lipids in 3 months on this dose of medication.  Her goal LDL cholesterol is approximately less than 100 mg/dL given her risk factor profile.  I also discussed potential referral to Dr. Macey Tyson, a specialist in FMD and she will look her up online to consider referral.  I will see her again in 3 months here in the office routine follow-up.  Please do not hesitate to call or contact me with any questions or concerns.    Vasile Boyce MD, LANCE, FACC  Director,  [facility]  Director,  [facility] Program  Associate Professor of Medicine  [employer] [facility] [facility] [employer]

## 2024-02-14 LAB
ATRIAL RATE: 87 BPM
P AXIS: 46 DEGREES
P OFFSET: 209 MS
P ONSET: 154 MS
PR INTERVAL: 144 MS
Q ONSET: 226 MS
QRS COUNT: 14 BEATS
QRS DURATION: 76 MS
QT INTERVAL: 358 MS
QTC CALCULATION(BAZETT): 430 MS
QTC FREDERICIA: 405 MS
R AXIS: 18 DEGREES
T AXIS: 3 DEGREES
T OFFSET: 405 MS
VENTRICULAR RATE: 87 BPM

## 2024-02-15 ENCOUNTER — HOSPITAL ENCOUNTER (OUTPATIENT)
Dept: RADIOLOGY | Facility: HOSPITAL | Age: 69
Discharge: HOME | End: 2024-02-15
Payer: MEDICARE

## 2024-02-15 DIAGNOSIS — Z12.31 ENCOUNTER FOR SCREENING MAMMOGRAM FOR MALIGNANT NEOPLASM OF BREAST: ICD-10-CM

## 2024-02-15 PROCEDURE — 77063 BREAST TOMOSYNTHESIS BI: CPT | Performed by: RADIOLOGY

## 2024-02-15 PROCEDURE — 77067 SCR MAMMO BI INCL CAD: CPT

## 2024-02-15 PROCEDURE — 77067 SCR MAMMO BI INCL CAD: CPT | Performed by: RADIOLOGY

## 2024-02-29 ENCOUNTER — OFFICE VISIT (OUTPATIENT)
Dept: NEUROLOGY | Facility: CLINIC | Age: 69
End: 2024-02-29
Payer: MEDICARE

## 2024-02-29 VITALS
TEMPERATURE: 98.4 F | WEIGHT: 136 LBS | SYSTOLIC BLOOD PRESSURE: 127 MMHG | RESPIRATION RATE: 18 BRPM | BODY MASS INDEX: 23.22 KG/M2 | HEART RATE: 111 BPM | HEIGHT: 64 IN | DIASTOLIC BLOOD PRESSURE: 77 MMHG

## 2024-02-29 DIAGNOSIS — G43.719 INTRACTABLE CHRONIC MIGRAINE WITHOUT AURA AND WITHOUT STATUS MIGRAINOSUS: Primary | ICD-10-CM

## 2024-02-29 DIAGNOSIS — M54.2 CERVICALGIA: ICD-10-CM

## 2024-02-29 PROCEDURE — 1036F TOBACCO NON-USER: CPT | Performed by: STUDENT IN AN ORGANIZED HEALTH CARE EDUCATION/TRAINING PROGRAM

## 2024-02-29 PROCEDURE — 1126F AMNT PAIN NOTED NONE PRSNT: CPT | Performed by: STUDENT IN AN ORGANIZED HEALTH CARE EDUCATION/TRAINING PROGRAM

## 2024-02-29 PROCEDURE — 99214 OFFICE O/P EST MOD 30 MIN: CPT | Performed by: STUDENT IN AN ORGANIZED HEALTH CARE EDUCATION/TRAINING PROGRAM

## 2024-02-29 PROCEDURE — 1160F RVW MEDS BY RX/DR IN RCRD: CPT | Performed by: STUDENT IN AN ORGANIZED HEALTH CARE EDUCATION/TRAINING PROGRAM

## 2024-02-29 PROCEDURE — 1159F MED LIST DOCD IN RCRD: CPT | Performed by: STUDENT IN AN ORGANIZED HEALTH CARE EDUCATION/TRAINING PROGRAM

## 2024-02-29 RX ORDER — PROMETHAZINE HYDROCHLORIDE 12.5 MG/1
12.5 TABLET ORAL EVERY 6 HOURS PRN
Qty: 10 TABLET | Refills: 0 | Status: SHIPPED | OUTPATIENT
Start: 2024-02-29 | End: 2024-03-05 | Stop reason: SDUPTHER

## 2024-02-29 RX ORDER — ELETRIPTAN HYDROBROMIDE 20 MG/1
20 TABLET, FILM COATED ORAL ONCE AS NEEDED
Qty: 9 TABLET | Refills: 0 | Status: SHIPPED | OUTPATIENT
Start: 2024-02-29 | End: 2024-03-01 | Stop reason: SDUPTHER

## 2024-02-29 ASSESSMENT — PAIN SCALES - GENERAL: PAINLEVEL: 0-NO PAIN

## 2024-02-29 NOTE — PROGRESS NOTES
Subjective     Marichuy Guajardo is a 68 y.o. year old female for follow-up of migraine headaches and cervicalgia.     She was last seen 3/6/2023.     She was having 16-18 headache days per month. She recognized she was overusing the Imitrex. This helped but now will take 2-3 days for the headaches to resolve. Now the Imitrex is less effective, especially if she misses the two hour window.     She continues to have headaches every other day. The headaches start in the back of her head/neck on the R paraspinal muscles and radiates to her temples bilaterally and can be retroorbital at times. The pain is pulsating or throbbing and tight. She has associated phonophobia and nausea, will belch more when the migraine is resolving.     Heating pad is beneficial but has to use this frequently.     She has a remote history of carotid artery dissection, follows with vascular surgery at [facility]. Ok for low dose triptan.     Preventative therapy: amitriptyline (previously tried; was too sedating and had tachycardia), nortriptyline (could not tolerate due to fatigue), nadolol, magnesium  Recently started co-q10 a few weeks ago at 50mg daily and is considering increasing to 100mg daily     Nurtec not covered by insurance and also was not effective when she tried as sample.     Tried PT without significant benefit. Discussed qulipta and other CGRPs but have been cost prohibitive     No dietary triggers have been identified   Feels she sleeps 8 hours, has daytime fatigue in the past few months. She snores and was diagnosed with mild KIRSTEN.      Abortive treatment: sumatriptan 12.5mg tablet (has SE of nausea), rizatriptan (not effective), Ubrelvy (not effective)    Patient Active Problem List   Diagnosis    Anxiety disorder    Arteritis (CMS/HCC)    Asthma exacerbation    Carotid artery dissection (CMS/HCC)    Cervicalgia    Colon polyp    Depression, major, single episode, mild (CMS/HCC)    Diverticulitis of colon    Hyperlipidemia     IBS (irritable bowel syndrome)    Lumbar disc disease    Lumbosacral neuritis    Migraine    Multiple pulmonary nodules    Psoriasis    Pure hypercholesterolemia       Objective   Neurological Exam  Mental Status  Awake, alert and oriented to person, place and time. Speech is normal.    Cranial Nerves  CN II: Visual fields full to confrontation.  CN III, IV, VI: Extraocular movements intact bilaterally.  CN V: Facial sensation is normal.  CN VII: Full and symmetric facial movement.  CN VIII: Hearing is normal.  CN IX, X: Palate elevates symmetrically  CN XI: Shoulder shrug strength is normal.  CN XII: Tongue midline without atrophy or fasciculations.    Motor   Strength is 5/5 throughout all four extremities.    Sensory  Light touch is normal in upper and lower extremities.     Coordination  Right: Finger-to-nose normal.Left: Finger-to-nose normal.    Physical Exam  Eyes:      Extraocular Movements: Extraocular movements intact.   Neurological:      Motor: Motor strength is normal.  Psychiatric:         Speech: Speech normal.       Assessment/Plan   Diagnoses and all orders for this visit:  Intractable chronic migraine without aura and without status migrainosus  Cervicalgia  Intractable chronic migraine without aura: has tried beta blockers, TCAs, CGRP (Nurtec and Ubrelvy) without effect. Will try Botox for chronic migraine. Will add phenergan and switch sumatriptan to eletriptan 20mg daily.     Follow-up in 6 months

## 2024-02-29 NOTE — PATIENT INSTRUCTIONS
For your migraine headaches:   Preventative therapy: continue magnesium and Coq10. I sent in a prescription for Botox for migraine to the  specialty pharmacy and we will work on the prior authorization and call you with the results    Abortive therapy: change the sumatriptan to eletriptan. And you can also take phenergan (nausea medication) for the migraines. If the eletriptan does not work, you can switch back to sumatriptan but you should not combine them. If the triptan does not work, then another option that you can take with it is Excedrin tension which is acetaminophen (tylenol) with caffeine.     Follow-up in 6 months

## 2024-03-01 DIAGNOSIS — G43.719 INTRACTABLE CHRONIC MIGRAINE WITHOUT AURA AND WITHOUT STATUS MIGRAINOSUS: ICD-10-CM

## 2024-03-04 ENCOUNTER — SPECIALTY PHARMACY (OUTPATIENT)
Dept: PHARMACY | Facility: CLINIC | Age: 69
End: 2024-03-04

## 2024-03-04 DIAGNOSIS — G43.719 INTRACTABLE CHRONIC MIGRAINE WITHOUT AURA AND WITHOUT STATUS MIGRAINOSUS: Primary | ICD-10-CM

## 2024-03-05 DIAGNOSIS — G43.719 INTRACTABLE CHRONIC MIGRAINE WITHOUT AURA AND WITHOUT STATUS MIGRAINOSUS: ICD-10-CM

## 2024-03-05 RX ORDER — ELETRIPTAN HYDROBROMIDE 20 MG/1
20 TABLET, FILM COATED ORAL DAILY PRN
Qty: 9 TABLET | Refills: 11 | Status: SHIPPED | OUTPATIENT
Start: 2024-03-05 | End: 2024-05-20 | Stop reason: DRUGHIGH

## 2024-03-05 RX ORDER — PROMETHAZINE HYDROCHLORIDE 12.5 MG/1
12.5 TABLET ORAL EVERY 6 HOURS PRN
Qty: 10 TABLET | Refills: 11 | Status: SHIPPED | OUTPATIENT
Start: 2024-03-05 | End: 2024-05-20 | Stop reason: DRUGHIGH

## 2024-03-22 ENCOUNTER — APPOINTMENT (OUTPATIENT)
Dept: NEUROLOGY | Facility: CLINIC | Age: 69
End: 2024-03-22
Payer: MEDICARE

## 2024-04-10 ENCOUNTER — PATIENT MESSAGE (OUTPATIENT)
Dept: CARDIOLOGY | Facility: CLINIC | Age: 69
End: 2024-04-10
Payer: MEDICARE

## 2024-04-10 DIAGNOSIS — E78.5 HYPERLIPIDEMIA, UNSPECIFIED HYPERLIPIDEMIA TYPE: Primary | ICD-10-CM

## 2024-05-13 ENCOUNTER — APPOINTMENT (OUTPATIENT)
Dept: PRIMARY CARE | Facility: CLINIC | Age: 69
End: 2024-05-13
Payer: MEDICARE

## 2024-05-17 ENCOUNTER — LAB (OUTPATIENT)
Dept: LAB | Facility: LAB | Age: 69
End: 2024-05-17
Payer: MEDICARE

## 2024-05-17 DIAGNOSIS — E78.5 HYPERLIPIDEMIA, UNSPECIFIED HYPERLIPIDEMIA TYPE: ICD-10-CM

## 2024-05-17 LAB
ANION GAP SERPL CALC-SCNC: 15 MMOL/L (ref 10–20)
BUN SERPL-MCNC: 17 MG/DL (ref 6–23)
CALCIUM SERPL-MCNC: 9.8 MG/DL (ref 8.6–10.6)
CHLORIDE SERPL-SCNC: 101 MMOL/L (ref 98–107)
CHOLEST SERPL-MCNC: 177 MG/DL (ref 0–199)
CHOLESTEROL/HDL RATIO: 3.2
CO2 SERPL-SCNC: 30 MMOL/L (ref 21–32)
CREAT SERPL-MCNC: 0.96 MG/DL (ref 0.5–1.05)
EGFRCR SERPLBLD CKD-EPI 2021: 65 ML/MIN/1.73M*2
GLUCOSE SERPL-MCNC: 107 MG/DL (ref 74–99)
HDLC SERPL-MCNC: 54.5 MG/DL
LDLC SERPL CALC-MCNC: 102 MG/DL
NON HDL CHOLESTEROL: 123 MG/DL (ref 0–149)
POTASSIUM SERPL-SCNC: 4.3 MMOL/L (ref 3.5–5.3)
SODIUM SERPL-SCNC: 142 MMOL/L (ref 136–145)
TRIGL SERPL-MCNC: 105 MG/DL (ref 0–149)
VLDL: 21 MG/DL (ref 0–40)

## 2024-05-17 PROCEDURE — 80048 BASIC METABOLIC PNL TOTAL CA: CPT

## 2024-05-17 PROCEDURE — 80061 LIPID PANEL: CPT

## 2024-05-17 PROCEDURE — 36415 COLL VENOUS BLD VENIPUNCTURE: CPT

## 2024-05-20 ENCOUNTER — OFFICE VISIT (OUTPATIENT)
Dept: PRIMARY CARE | Facility: CLINIC | Age: 69
End: 2024-05-20
Payer: MEDICARE

## 2024-05-20 VITALS
OXYGEN SATURATION: 95 % | HEIGHT: 64 IN | TEMPERATURE: 97.2 F | RESPIRATION RATE: 16 BRPM | BODY MASS INDEX: 23.15 KG/M2 | SYSTOLIC BLOOD PRESSURE: 110 MMHG | DIASTOLIC BLOOD PRESSURE: 68 MMHG | WEIGHT: 135.6 LBS | HEART RATE: 88 BPM

## 2024-05-20 DIAGNOSIS — G47.33 OSA (OBSTRUCTIVE SLEEP APNEA): ICD-10-CM

## 2024-05-20 DIAGNOSIS — E78.41 ELEVATED LIPOPROTEIN(A): ICD-10-CM

## 2024-05-20 DIAGNOSIS — G43.719 INTRACTABLE CHRONIC MIGRAINE WITHOUT AURA AND WITHOUT STATUS MIGRAINOSUS: ICD-10-CM

## 2024-05-20 DIAGNOSIS — M54.2 NECK PAIN ON RIGHT SIDE: Primary | ICD-10-CM

## 2024-05-20 DIAGNOSIS — F32.0 DEPRESSION, MAJOR, SINGLE EPISODE, MILD (CMS-HCC): ICD-10-CM

## 2024-05-20 PROBLEM — I65.23 BILATERAL CAROTID ARTERY STENOSIS: Status: ACTIVE | Noted: 2023-12-21

## 2024-05-20 PROBLEM — R73.9 HYPERGLYCEMIA: Status: ACTIVE | Noted: 2024-05-20

## 2024-05-20 PROBLEM — M62.81 MUSCLE WEAKNESS: Status: ACTIVE | Noted: 2022-10-24

## 2024-05-20 PROBLEM — S82.409A CLOSED FRACTURE OF FIBULA: Status: ACTIVE | Noted: 2022-02-10

## 2024-05-20 PROBLEM — R00.2 PALPITATIONS: Status: ACTIVE | Noted: 2024-05-20

## 2024-05-20 PROBLEM — H81.10 BENIGN PAROXYSMAL POSITIONAL VERTIGO: Status: ACTIVE | Noted: 2024-05-20

## 2024-05-20 PROBLEM — D72.829 LEUKOCYTOSIS: Status: ACTIVE | Noted: 2024-05-20

## 2024-05-20 PROBLEM — M25.50 ARTHRALGIA: Status: ACTIVE | Noted: 2024-05-20

## 2024-05-20 PROBLEM — F40.10 SOCIAL ANXIETY DISORDER: Status: ACTIVE | Noted: 2023-03-31

## 2024-05-20 PROBLEM — F43.22 ADJUSTMENT DISORDER WITH ANXIOUS MOOD: Status: ACTIVE | Noted: 2022-12-07

## 2024-05-20 PROBLEM — M79.673 PAIN OF FOOT: Status: ACTIVE | Noted: 2024-05-20

## 2024-05-20 PROBLEM — S82.63XA CLOSED FRACTURE OF LATERAL MALLEOLUS: Status: ACTIVE | Noted: 2022-02-11

## 2024-05-20 PROBLEM — I65.29 OCCLUSION OF CAROTID ARTERY: Status: ACTIVE | Noted: 2024-05-20

## 2024-05-20 PROBLEM — R20.2 PARESTHESIA OF LOWER EXTREMITY: Status: ACTIVE | Noted: 2024-05-20

## 2024-05-20 PROBLEM — R76.8 POSITIVE ANTINUCLEAR ANTIBODY: Status: ACTIVE | Noted: 2024-05-20

## 2024-05-20 PROBLEM — F41.0 PANIC DISORDER WITHOUT AGORAPHOBIA: Status: ACTIVE | Noted: 2023-05-30

## 2024-05-20 PROBLEM — M19.049 LOCALIZED, PRIMARY OSTEOARTHRITIS OF HAND: Status: ACTIVE | Noted: 2021-06-30

## 2024-05-20 PROBLEM — R10.9 ABDOMINAL DISCOMFORT: Status: ACTIVE | Noted: 2022-07-18

## 2024-05-20 PROCEDURE — 1159F MED LIST DOCD IN RCRD: CPT | Performed by: INTERNAL MEDICINE

## 2024-05-20 PROCEDURE — 1160F RVW MEDS BY RX/DR IN RCRD: CPT | Performed by: INTERNAL MEDICINE

## 2024-05-20 PROCEDURE — 99214 OFFICE O/P EST MOD 30 MIN: CPT | Performed by: INTERNAL MEDICINE

## 2024-05-20 PROCEDURE — 1036F TOBACCO NON-USER: CPT | Performed by: INTERNAL MEDICINE

## 2024-05-20 RX ORDER — PROMETHAZINE HYDROCHLORIDE 12.5 MG/1
12.5 TABLET ORAL EVERY 6 HOURS PRN
Qty: 10 TABLET | Refills: 11 | Status: SHIPPED | OUTPATIENT
Start: 2024-05-20 | End: 2024-05-20 | Stop reason: WASHOUT

## 2024-05-20 RX ORDER — ELETRIPTAN HYDROBROMIDE 20 MG/1
20 TABLET, FILM COATED ORAL DAILY PRN
Qty: 9 TABLET | Refills: 11 | Status: SHIPPED | OUTPATIENT
Start: 2024-05-20 | End: 2024-05-20 | Stop reason: WASHOUT

## 2024-05-20 RX ORDER — BISMUTH SUBSALICYLATE 262 MG
1 TABLET,CHEWABLE ORAL DAILY
COMMUNITY

## 2024-05-20 ASSESSMENT — ENCOUNTER SYMPTOMS
UNEXPECTED WEIGHT CHANGE: 0
CONSTIPATION: 0
WEAKNESS: 0
CHILLS: 0
ADENOPATHY: 0
CONFUSION: 0
DIARRHEA: 0
CHEST TIGHTNESS: 0
DYSURIA: 0
WHEEZING: 0
NAUSEA: 0
DIZZINESS: 0
SHORTNESS OF BREATH: 0
ARTHRALGIAS: 0
COUGH: 0
ABDOMINAL PAIN: 0
VOMITING: 0
JOINT SWELLING: 0
PALPITATIONS: 0
SORE THROAT: 0
FATIGUE: 0
SLEEP DISTURBANCE: 0

## 2024-05-20 ASSESSMENT — PATIENT HEALTH QUESTIONNAIRE - PHQ9
1. LITTLE INTEREST OR PLEASURE IN DOING THINGS: NOT AT ALL
2. FEELING DOWN, DEPRESSED OR HOPELESS: NOT AT ALL
SUM OF ALL RESPONSES TO PHQ9 QUESTIONS 1 AND 2: 0

## 2024-05-20 NOTE — PROGRESS NOTES
"Subjective   Marichuy Guajardo is a 68 y.o. female who presents for Establish Care (Establish care , transfer from dr Tyler ).  Establish care - transfer from dr Tyler   Neck pain going up to head x few months , on/off , took tylenol or advil   Had labs done 5.17.24  - recently  ordered by cholesterol  dr specialist, seen by dr Torres A LIPIDE SPECIALIST IN THE PAST.   Had sleep apnea test done 11.2023 , would like to talk  about  this   Is on atorvastatin, bs increased to 107, is worried about, no myalgia, will fu with lipide specialist  Labs and test reviewed with patient , all question answered, further evaluation, if needed , discussed.  Had a sleep study done has mild sleep apnea, will refer to sleep center          Review of Systems   Constitutional:  Negative for chills, fatigue and unexpected weight change.        Comment   HENT:  Negative for congestion, ear pain and sore throat.    Respiratory:  Negative for cough, chest tightness, shortness of breath and wheezing.    Cardiovascular:  Negative for palpitations and leg swelling.   Gastrointestinal:  Negative for abdominal pain, constipation, diarrhea, nausea and vomiting.   Genitourinary:  Negative for dysuria and urgency.   Musculoskeletal:  Negative for arthralgias and joint swelling.   Skin:  Negative for rash.   Neurological:  Negative for dizziness and weakness.   Hematological:  Negative for adenopathy.   Psychiatric/Behavioral:  Negative for confusion and sleep disturbance.        Objective   Physical Exam  /68 (BP Location: Left arm, Patient Position: Sitting)   Pulse 88   Temp 36.2 °C (97.2 °F)   Resp 16   Ht 1.626 m (5' 4\")   Wt 61.5 kg (135 lb 9.6 oz)   SpO2 95%   BMI 23.28 kg/m²       Assessment/Plan   Problem List Items Addressed This Visit       Depression, major, single episode, mild (CMS-HCC)    Hyperlipidemia - Primary     Will fu with cholesterol specialist         Migraine    Relevant Orders    Referral to Jose Daniel " [facility]    KIRSTEN (obstructive sleep apnea)    Relevant Orders    Referral to Adult Sleep Medicine    Neck pain on right side     Use zaira garcia.         Relevant Orders    Referral to [facility]

## 2024-05-21 ENCOUNTER — PATIENT MESSAGE (OUTPATIENT)
Dept: CARDIOLOGY | Facility: CLINIC | Age: 69
End: 2024-05-21
Payer: MEDICARE

## 2024-06-21 ENCOUNTER — TELEPHONE (OUTPATIENT)
Dept: NEUROLOGY | Facility: CLINIC | Age: 69
End: 2024-06-21
Payer: MEDICARE

## 2024-06-21 NOTE — TELEPHONE ENCOUNTER
Pt is calling in and states she needs to be excuses from jury duty on 7/29. She states she can't control what other people do and even the smell of perfume can trigger her migraines she states she is very sensitive to almost everything. I told her she can drop off the paperwork but it will be up to you on whether we excuse her or not.     Please Advise.    Thanks.

## 2024-08-21 ENCOUNTER — APPOINTMENT (OUTPATIENT)
Dept: PRIMARY CARE | Facility: CLINIC | Age: 69
End: 2024-08-21
Payer: MEDICARE

## 2024-08-21 VITALS
WEIGHT: 140 LBS | SYSTOLIC BLOOD PRESSURE: 116 MMHG | DIASTOLIC BLOOD PRESSURE: 68 MMHG | HEIGHT: 64 IN | HEART RATE: 94 BPM | TEMPERATURE: 98.4 F | OXYGEN SATURATION: 95 % | BODY MASS INDEX: 23.9 KG/M2 | RESPIRATION RATE: 16 BRPM

## 2024-08-21 DIAGNOSIS — E78.49 OTHER HYPERLIPIDEMIA: ICD-10-CM

## 2024-08-21 DIAGNOSIS — R53.83 OTHER FATIGUE: Primary | ICD-10-CM

## 2024-08-21 DIAGNOSIS — E55.9 VITAMIN D DEFICIENCY: ICD-10-CM

## 2024-08-21 DIAGNOSIS — F43.22 ADJUSTMENT DISORDER WITH ANXIOUS MOOD: ICD-10-CM

## 2024-08-21 DIAGNOSIS — G47.33 OSA (OBSTRUCTIVE SLEEP APNEA): ICD-10-CM

## 2024-08-21 PROBLEM — E78.00 PURE HYPERCHOLESTEROLEMIA: Status: RESOLVED | Noted: 2023-04-06 | Resolved: 2024-08-21

## 2024-08-21 PROCEDURE — 99214 OFFICE O/P EST MOD 30 MIN: CPT | Performed by: INTERNAL MEDICINE

## 2024-08-21 PROCEDURE — 1036F TOBACCO NON-USER: CPT | Performed by: INTERNAL MEDICINE

## 2024-08-21 PROCEDURE — 1159F MED LIST DOCD IN RCRD: CPT | Performed by: INTERNAL MEDICINE

## 2024-08-21 PROCEDURE — 3008F BODY MASS INDEX DOCD: CPT | Performed by: INTERNAL MEDICINE

## 2024-08-21 PROCEDURE — 1160F RVW MEDS BY RX/DR IN RCRD: CPT | Performed by: INTERNAL MEDICINE

## 2024-08-21 PROCEDURE — 1123F ACP DISCUSS/DSCN MKR DOCD: CPT | Performed by: INTERNAL MEDICINE

## 2024-08-21 ASSESSMENT — ENCOUNTER SYMPTOMS
CHEST TIGHTNESS: 0
CONFUSION: 0
SHORTNESS OF BREATH: 0
SLEEP DISTURBANCE: 0
CONSTIPATION: 0
ADENOPATHY: 0
DIZZINESS: 0
UNEXPECTED WEIGHT CHANGE: 0
DIARRHEA: 0
FATIGUE: 1
JOINT SWELLING: 0
WHEEZING: 0
SORE THROAT: 0
VOMITING: 0
COUGH: 0
PALPITATIONS: 0
WEAKNESS: 0
CHILLS: 0
NAUSEA: 0
DYSURIA: 0
ARTHRALGIAS: 0
ABDOMINAL PAIN: 0

## 2024-08-21 NOTE — PATIENT INSTRUCTIONS
Was nice seeing you today.  Continue same medication.  Have lab work done before next appointment if labs were ordered today.  Fu in 1 month.  Call/ contact our office with any concerns.  Fu with sleep center    If you have labs or test done and you can't see the report in your chart or you didn't hear from us in 2 weeks after test/labs done , please, call our office for reports.  Please , do not assume that they were normal.    Any test results  and questions you might have , will be discussed at next visit -- please make sure to make a follow up appt after testing if reports are abnormal or you have questions.

## 2024-08-21 NOTE — PROGRESS NOTES
Subjective   Marichuy Guajardo is a 68 y.o. female who presents for Follow-up and Fatigue.  Patient stated is fatigue, for a while- hard to wake up, tired all day,its been a while patient stated she thinks is her tyroid or something is off.  Falling asleep during the day.  Patient stated she has chronic migraines, taking magnesium at night      Review of Systems   Constitutional:  Positive for fatigue. Negative for chills and unexpected weight change.        Comment   HENT:  Negative for congestion, ear pain and sore throat.    Respiratory:  Negative for cough, chest tightness, shortness of breath and wheezing.    Cardiovascular:  Negative for palpitations and leg swelling.   Gastrointestinal:  Negative for abdominal pain, constipation, diarrhea, nausea and vomiting.   Genitourinary:  Negative for dysuria and urgency.   Musculoskeletal:  Negative for arthralgias and joint swelling.   Skin:  Negative for rash.   Neurological:  Negative for dizziness and weakness.   Hematological:  Negative for adenopathy.   Psychiatric/Behavioral:  Negative for confusion and sleep disturbance.    All other systems reviewed and are negative.      Objective   Physical Exam  Constitutional:       Appearance: Normal appearance.   HENT:      Head: Normocephalic and atraumatic.   Eyes:      Pupils: Pupils are equal, round, and reactive to light.   Cardiovascular:      Rate and Rhythm: Normal rate and regular rhythm.   Pulmonary:      Effort: Pulmonary effort is normal.      Breath sounds: Normal breath sounds.   Musculoskeletal:         General: Normal range of motion.      Cervical back: Normal range of motion and neck supple.   Skin:     General: Skin is warm.   Neurological:      General: No focal deficit present.      Mental Status: She is alert and oriented to person, place, and time.   Psychiatric:         Mood and Affect: Mood normal.         Behavior: Behavior normal.       /68 (BP Location: Left arm, Patient Position:  "Sitting, BP Cuff Size: Small adult)   Pulse 94   Temp 36.9 °C (98.4 °F)   Resp 16   Ht 1.626 m (5' 4\")   Wt 63.5 kg (140 lb)   SpO2 95%   BMI 24.03 kg/m²     IMPRESSION     This is a technically adequate home sleep apnea test.   Based on the AASM recommended definition, the sleep study is consistent with   a diagnosis of mild obstructive sleep apnea.  The Sp02 rocael was 80.0%.   Based on the CMS definition, no significant sleep-related breathing disorder   is observed in this study.  The Sp02 rocael was 80.0%.   The MANISH may be under-estimated due to the inability to detect certain types   of respiratory events in the absence of EEG.   RECOMMENDATIONS     If PAP therapy will be pursued, consider empiric initiation of auto-adjusting   CPAP with settings of 4-12 cm H2O with close clinic follow-up and monitoring   of PAP data to ensure control of the patient's sleep apnea. If necessary, a   dedicated titration study may be considered.   Please note that Medicare/CMS insurance has specific compliance requirements   that must be followed during the first 31- 90 days of use.   If PAP therapy is not covered by insurance due to not meeting the CMS   criteria, a repeat diagnostic sleep study can be considered. Alternatively,   the patient could purchase a CPAP device on their own.   Further clinical correlation is needed to assess risk factors and treatment   options. CPAP, oral appliance therapy, positional therapy and/or weight loss,   where applicable, are the most commonly used and recommended first-line   treatment options in this range of KIRSTEN depending on patient factors and   preference.   General guidelines for conservative and behavioral management of KIRSTEN:   â€ƒ1) Consider positional therapy (non-supine sleeping position).   â€ƒ2) Avoid sedating medications, alcohol, and tobacco, if applicable.   Adequate sleep hygiene (good sleep habits) should be emphasized.   Review and advise on habits of sleep duration, " "regularity, timing, and   environment for sleep health.   Safety management: Avoid driving vehicle or operating heavy machinery when   sleepy.   FOLLOW-UP   Lab Results   Component Value Date    TSH 1.74 10/17/2023     Lab Results   Component Value Date    WBC 6.0 10/17/2023    HGB 13.2 10/17/2023    HCT 41.9 10/17/2023    MCV 93 10/17/2023     10/17/2023     \.last bmp  Lab Results   Component Value Date    ALT 17 10/17/2023    AST 18 10/17/2023    ALKPHOS 43 10/17/2023    BILITOT 0.3 10/17/2023     Lab Results   Component Value Date    CHOL 177 05/17/2024    CHOL 151 11/07/2023    CHOL 225 (H) 07/17/2023     Lab Results   Component Value Date    HDL 54.5 05/17/2024    HDL 46.9 11/07/2023    HDL 51.7 07/17/2023     Lab Results   Component Value Date    LDLCALC 102 (H) 05/17/2024    LDLCALC 88 11/07/2023     Lab Results   Component Value Date    TRIG 105 05/17/2024    TRIG 82 11/07/2023    TRIG 99 07/17/2023     No components found for: \"CHOLHDL\"      Assessment/Plan   Problem List Items Addressed This Visit       Hyperlipidemia    Relevant Orders    Lipid Panel    Comprehensive Metabolic Panel    Adjustment disorder with anxious mood     Zoloft decreased to 25 mg recently.         KIRSTEN (obstructive sleep apnea)     Had sleep study done , per pt was told that has mild condition, to fu for further eval         Other fatigue - Primary    Relevant Orders    CBC and Auto Differential    TSH with reflex to Free T4 if abnormal    Vitamin B12    Magnesium     Other Visit Diagnoses       Vitamin D deficiency        Relevant Orders    Vitamin D 25-Hydroxy,Total (for eval of Vitamin D levels)            "

## 2024-08-27 ENCOUNTER — LAB (OUTPATIENT)
Dept: LAB | Facility: LAB | Age: 69
End: 2024-08-27
Payer: MEDICARE

## 2024-08-27 DIAGNOSIS — R53.83 OTHER FATIGUE: ICD-10-CM

## 2024-08-27 DIAGNOSIS — E78.49 OTHER HYPERLIPIDEMIA: ICD-10-CM

## 2024-08-27 DIAGNOSIS — E03.8 OTHER SPECIFIED HYPOTHYROIDISM: Primary | ICD-10-CM

## 2024-08-27 DIAGNOSIS — E55.9 VITAMIN D DEFICIENCY: ICD-10-CM

## 2024-08-27 LAB
25(OH)D3 SERPL-MCNC: 84 NG/ML (ref 30–100)
ALBUMIN SERPL BCP-MCNC: 4.1 G/DL (ref 3.4–5)
ALP SERPL-CCNC: 43 U/L (ref 33–136)
ALT SERPL W P-5'-P-CCNC: 18 U/L (ref 7–45)
ANION GAP SERPL CALC-SCNC: 9 MMOL/L (ref 10–20)
AST SERPL W P-5'-P-CCNC: 20 U/L (ref 9–39)
BASOPHILS # BLD AUTO: 0.06 X10*3/UL (ref 0–0.1)
BASOPHILS NFR BLD AUTO: 0.9 %
BILIRUB SERPL-MCNC: 0.5 MG/DL (ref 0–1.2)
BUN SERPL-MCNC: 16 MG/DL (ref 6–23)
CALCIUM SERPL-MCNC: 9.2 MG/DL (ref 8.6–10.3)
CHLORIDE SERPL-SCNC: 104 MMOL/L (ref 98–107)
CHOLEST SERPL-MCNC: 168 MG/DL (ref 0–199)
CHOLESTEROL/HDL RATIO: 3.6
CO2 SERPL-SCNC: 32 MMOL/L (ref 21–32)
CREAT SERPL-MCNC: 0.88 MG/DL (ref 0.5–1.05)
EGFRCR SERPLBLD CKD-EPI 2021: 72 ML/MIN/1.73M*2
EOSINOPHIL # BLD AUTO: 0.48 X10*3/UL (ref 0–0.7)
EOSINOPHIL NFR BLD AUTO: 7.5 %
ERYTHROCYTE [DISTWIDTH] IN BLOOD BY AUTOMATED COUNT: 12.8 % (ref 11.5–14.5)
GLUCOSE SERPL-MCNC: 97 MG/DL (ref 74–99)
HCT VFR BLD AUTO: 41.8 % (ref 36–46)
HDLC SERPL-MCNC: 47.2 MG/DL
HGB BLD-MCNC: 13.4 G/DL (ref 12–16)
IMM GRANULOCYTES # BLD AUTO: 0.01 X10*3/UL (ref 0–0.7)
IMM GRANULOCYTES NFR BLD AUTO: 0.2 % (ref 0–0.9)
LDLC SERPL CALC-MCNC: 94 MG/DL
LYMPHOCYTES # BLD AUTO: 2.42 X10*3/UL (ref 1.2–4.8)
LYMPHOCYTES NFR BLD AUTO: 37.7 %
MAGNESIUM SERPL-MCNC: 2.23 MG/DL (ref 1.6–2.4)
MCH RBC QN AUTO: 29.1 PG (ref 26–34)
MCHC RBC AUTO-ENTMCNC: 32.1 G/DL (ref 32–36)
MCV RBC AUTO: 91 FL (ref 80–100)
MONOCYTES # BLD AUTO: 0.61 X10*3/UL (ref 0.1–1)
MONOCYTES NFR BLD AUTO: 9.5 %
NEUTROPHILS # BLD AUTO: 2.84 X10*3/UL (ref 1.2–7.7)
NEUTROPHILS NFR BLD AUTO: 44.2 %
NON HDL CHOLESTEROL: 121 MG/DL (ref 0–149)
NRBC BLD-RTO: 0 /100 WBCS (ref 0–0)
PLATELET # BLD AUTO: 262 X10*3/UL (ref 150–450)
POTASSIUM SERPL-SCNC: 4.1 MMOL/L (ref 3.5–5.3)
PROT SERPL-MCNC: 6.8 G/DL (ref 6.4–8.2)
RBC # BLD AUTO: 4.61 X10*6/UL (ref 4–5.2)
SODIUM SERPL-SCNC: 141 MMOL/L (ref 136–145)
T4 FREE SERPL-MCNC: 0.82 NG/DL (ref 0.61–1.12)
TRIGL SERPL-MCNC: 133 MG/DL (ref 0–149)
TSH SERPL-ACNC: 5.22 MIU/L (ref 0.44–3.98)
VIT B12 SERPL-MCNC: 607 PG/ML (ref 211–911)
VLDL: 27 MG/DL (ref 0–40)
WBC # BLD AUTO: 6.4 X10*3/UL (ref 4.4–11.3)

## 2024-08-27 PROCEDURE — 85025 COMPLETE CBC W/AUTO DIFF WBC: CPT

## 2024-08-27 PROCEDURE — 84443 ASSAY THYROID STIM HORMONE: CPT

## 2024-08-27 PROCEDURE — 83735 ASSAY OF MAGNESIUM: CPT

## 2024-08-27 PROCEDURE — 82607 VITAMIN B-12: CPT

## 2024-08-27 PROCEDURE — 82306 VITAMIN D 25 HYDROXY: CPT

## 2024-08-27 PROCEDURE — 36415 COLL VENOUS BLD VENIPUNCTURE: CPT

## 2024-08-27 PROCEDURE — 80053 COMPREHEN METABOLIC PANEL: CPT

## 2024-08-27 PROCEDURE — 80061 LIPID PANEL: CPT

## 2024-08-27 PROCEDURE — 84439 ASSAY OF FREE THYROXINE: CPT

## 2024-08-27 RX ORDER — LEVOTHYROXINE SODIUM 25 UG/1
25 TABLET ORAL DAILY
Qty: 30 TABLET | Refills: 11 | Status: SHIPPED | OUTPATIENT
Start: 2024-08-27 | End: 2025-08-27

## 2024-08-27 NOTE — PROGRESS NOTES
Patient: Marichuy Guajardo  : 1955 AGE: 68 y.o. SEX:female   MRN: [id number]   Provider: QUIRINO Alonso-CNP     Location [facility]   Service Date: 2024     PCP: Helen Houston MD   Referred by: Helen Houston MD          [facility]  New Visit Note      HISTORY OF PRESENT ILLNESS     Marichuy Guajardo is a 68 y.o. female with a h/o KIRSTEN, Anxiety, and GERD  who presents to [facility].    24: NPV with concerns of KIRSTEN management. Reports loud snoring ( sleeps in separate bedroom), un refreshed sleep, and daytime fatigue. Recently dx with hypothyroidism, has not start on synthroid. Uses mouth guard for teeth grinding.  is on a CPAP with FFM and patient does not think she would be able to tolerate. Has tried chin strap but found uncomfortable.       SLEEP STUDY HISTORY (personally reviewed raw data such as interpretation report, data sheet, hypnogram, and titration table if available and applicable)  - HSAT 2023- showing no KIRSTEN based on CMS criteria with MANISH 4% 3.6, SpO2 rocael 80% SpO2 <=88% for 25min    SLEEP-WAKE SCHEDULE    Sleep Patterns: She does not have a usual bed partner. In terms of the patient's sleep/wake cycle, she generally gets into bed at approximately 11 PM.  She reads in bed, and her latency to sleep onset after lights out is 5min. During the night, the patient generally awakens 2-3 times nightly. These awakenings are usually brief in duration. Final wake time on weekday mornings is around 8 AM.    Compared to weekdays, the patient's sleep schedule is  similar on the weekends.    Breathing during sleep: snoring, gasping/choking for air, nasal congestion, and teeth grinding (uses mouth guard)  Behaviors at night: No   Sleep paralysis: No   Hypnogogic or hypnopompic hallucinations: No   Cataplexy: No     Leg symptoms and timing:  - Sensations: Patient does not have unusual  sensations in their extremities that cause an urge to move them   - Movement: Patient has not been told that their legs kick or jerk during sleep    Daytime Symptoms:  On awakening patient reports: wake unrefreshed, feels sleepy, morning headaches, morning dry mouth, and hard to get out of bed  Patient report some daytime symptoms including: DAYTIME SYMPTOMS: reports sleep inertia difficulty with memory or concentration during the day    Sleep environment:  Preferred sleep position: side  Room is dark: Yes  Room is quiet: Yes  Room is cool: Yes  Bed comfort: good    SLEEP HABITS  Caffeine consumption: Yes, 1-2 cups/day  Alcohol consumption: Yes, rarely (occasional)  Smoking: No  Marijuana: No  Sleep aids: clonazepam 0.25mg     WEIGHT: gained 8 lbs in <1 month      ESS: 10  JUANCHO: 9    REVIEW OF SYSTEMS     All other systems have been reviewed and are negative.    ALLERGIES     Allergies   Allergen Reactions    Ciprofloxacin Unknown    Codeine Other    Other Headache     [place],perfumes - in general all - did se the allergologist    Headaches, losing voice, sometimes chocking while trying to talk     Statins-Hmg-Coa Reductase Inhibitors Unknown    Sulfa (Sulfonamide Antibiotics) Unknown    Pantoprazole Unknown     Muscle weakness       MEDICATIONS     Current Outpatient Medications   Medication Sig Dispense Refill    atorvastatin (Lipitor) 20 mg tablet Take 1 tablet (20 mg) by mouth once daily. 30 tablet 11    cetirizine (ZyrTEC) 10 mg tablet Take 1 tablet (10 mg) by mouth once daily.      clobetasol (Temovate) 0.05 % external solution Apply 1 Application topically 2 times a day. APPLY AND GENTLY MASSAGE INTO AFFECTED AREA(S)      clonazePAM (KlonoPIN) 0.5 mg tablet Take 1 tablet (0.5 mg) by mouth once daily. 30 tablet 0    co-enzyme Q-10 50 mg capsule Take 1 capsule (50 mg) by mouth once daily. 30 capsule 11    famotidine (Pepcid) 20 mg tablet Take 1 tablet (20 mg) by mouth 3 times a day.      levothyroxine  "(Synthroid, Levoxyl) 25 mcg tablet Take 1 tablet (25 mcg) by mouth early in the morning.. Take on an empty stomach at the same time each day, either 30 to 60 minutes prior to breakfast 30 tablet 11    multivitamin tablet Take 1 tablet by mouth once daily.      nadolol (Corgard) 20 mg tablet Take 1 tablet (20 mg) by mouth once daily. States takes 5 m-10 mg once a week      sertraline (Zoloft) 25 mg tablet Take 1 tablet (25 mg) by mouth once daily. 50mg QD- per specialist dr order      SUMAtriptan (Imitrex) 25 mg tablet Take 1 tablet (25 mg) by mouth once daily as needed for migraine. May repeat dose once in 2 hours if no relief.  Do not exceed 2 doses in 24 hours. 9 tablet 11     No current facility-administered medications for this visit.       PAST HISTORIES     PERTINENT PAST MEDICAL HISTORY: See HPI    PERTINENT PAST SURGICAL HISTORY for Sleep Medicine:  tonsillectomy and adenoidectomy and sinus surgery      PERTINENT FAMILY HISTORY for Sleep Medicine:  sleep apnea on PAP- brother   loud snoring- father     PERTINENT SOCIAL HISTORY:  She  reports that she has never smoked. She has never used smokeless tobacco. She reports current alcohol use. She reports that she does not use drugs. She currently lives with spouse.    Active Problems, Allergy List, Medication List, and PMH/PSH/FH/Social Hx have been reviewed and reconciled in chart. No significant changes unless documented in the pertinent chart section. Updates made when necessary.     PHYSICAL EXAM     VITAL SIGNS: /82   Pulse 90   Temp 36.8 °C (98.2 °F) (Temporal)   Ht 1.626 m (5' 4\")   Wt 63.5 kg (140 lb)   SpO2 95%   BMI 24.03 kg/m²     CURRENT WEIGHT:   Vitals:    09/09/24 0949   Weight: 63.5 kg (140 lb)      PREVIOUS WEIGHTS:  Wt Readings from Last 3 Encounters:   09/09/24 63.5 kg (140 lb)   09/04/24 63 kg (139 lb)   08/29/24 63 kg (139 lb)       Physical Exam  Constitutional: Awake, not in distress  Lungs: Clear to auscultation bilateral, no " "cough noted  Heart: Regular rate and rhythm  Skin: Warm, no rash  Neuro: No tremors, moves all extremities  Psych: alert and oriented to time, place, and person    HEENT:   Airway comments: narrow lateral walls   Tongue scalloping: no   Modified Mallampati score - 2    RESULTS/DATA     No results found for: \"IRON\", \"TRANSFERRIN\", \"IRONSAT\", \"TIBC\", \"FERRITIN\"    Bicarbonate   Date Value Ref Range Status   08/27/2024 32 21 - 32 mmol/L Final       ASSESSMENT/PLAN     Ms. Guajardo is a 68 y.o. female and She was referred to the [facility] for evaluation of KIRSTEN    Problem List, Orders, Assessment, Recommendations:    #KIRSTEN  - HSAT 11/9/2023- showing no KIRSTEN based on CMS criteria with MANISH 4% 3.6, SpO2 rocael 80% SpO2 <=88% for 25min  - Repeat in lab PSG or purchase out of pocket   -  may have old machine she would first like to try, advised to bring into office to verify and adjust setting  - considering OAT, provided list of dentists  - positional therapy, mouth tape, chin strap, nasal dilators   - diet, exercise, and weight loss were emphasized today in clinic, as were non-supine sleep, avoiding alcohol in the late evening, and driving or operating heavy machinery when sleepy. Patient verbalized understanding.     #POOR SLEEP HYGIENE / EXCESSIVE DAYTIME SLEEPINESS / FATIGUE + SLEEP DISTURBANCES  - likely due to combination of poor sleep hygiene, depression, anxiety, untreated sleep apnea, chronic pain, and nocturia. Meds may be a contributing factor as well.  - discussed with patient good sleep hygiene  - will reevaluate after successful testing and treatment of KIRSTEN     #Bruxism  - Continue mouth guard       All of patient's questions were answered. She verbalizes understanding and agreement with my assessment and plan.    Disposition    Return to clinic in 1.5 months   "

## 2024-08-29 ENCOUNTER — APPOINTMENT (OUTPATIENT)
Dept: NEUROLOGY | Facility: CLINIC | Age: 69
End: 2024-08-29
Payer: MEDICARE

## 2024-08-29 VITALS
HEART RATE: 103 BPM | DIASTOLIC BLOOD PRESSURE: 72 MMHG | HEIGHT: 64 IN | BODY MASS INDEX: 23.73 KG/M2 | TEMPERATURE: 97.7 F | SYSTOLIC BLOOD PRESSURE: 112 MMHG | WEIGHT: 139 LBS

## 2024-08-29 DIAGNOSIS — G43.009 MIGRAINE WITHOUT AURA AND WITHOUT STATUS MIGRAINOSUS, NOT INTRACTABLE: ICD-10-CM

## 2024-08-29 DIAGNOSIS — G43.719 INTRACTABLE CHRONIC MIGRAINE WITHOUT AURA AND WITHOUT STATUS MIGRAINOSUS: Primary | ICD-10-CM

## 2024-08-29 PROCEDURE — 1159F MED LIST DOCD IN RCRD: CPT | Performed by: STUDENT IN AN ORGANIZED HEALTH CARE EDUCATION/TRAINING PROGRAM

## 2024-08-29 PROCEDURE — 1160F RVW MEDS BY RX/DR IN RCRD: CPT | Performed by: STUDENT IN AN ORGANIZED HEALTH CARE EDUCATION/TRAINING PROGRAM

## 2024-08-29 PROCEDURE — 3008F BODY MASS INDEX DOCD: CPT | Performed by: STUDENT IN AN ORGANIZED HEALTH CARE EDUCATION/TRAINING PROGRAM

## 2024-08-29 PROCEDURE — 1123F ACP DISCUSS/DSCN MKR DOCD: CPT | Performed by: STUDENT IN AN ORGANIZED HEALTH CARE EDUCATION/TRAINING PROGRAM

## 2024-08-29 PROCEDURE — 1036F TOBACCO NON-USER: CPT | Performed by: STUDENT IN AN ORGANIZED HEALTH CARE EDUCATION/TRAINING PROGRAM

## 2024-08-29 PROCEDURE — 99214 OFFICE O/P EST MOD 30 MIN: CPT | Performed by: STUDENT IN AN ORGANIZED HEALTH CARE EDUCATION/TRAINING PROGRAM

## 2024-08-29 RX ORDER — SUMATRIPTAN SUCCINATE 25 MG/1
25 TABLET ORAL DAILY PRN
Qty: 9 TABLET | Refills: 11 | Status: SHIPPED | OUTPATIENT
Start: 2024-08-29 | End: 2025-08-29

## 2024-08-29 ASSESSMENT — LIFESTYLE VARIABLES
HOW MANY STANDARD DRINKS CONTAINING ALCOHOL DO YOU HAVE ON A TYPICAL DAY: 1 OR 2
HOW OFTEN DO YOU HAVE SIX OR MORE DRINKS ON ONE OCCASION: MONTHLY
AUDIT-C TOTAL SCORE: 3
SKIP TO QUESTIONS 9-10: 0
HOW OFTEN DO YOU HAVE A DRINK CONTAINING ALCOHOL: MONTHLY OR LESS

## 2024-08-29 ASSESSMENT — PATIENT HEALTH QUESTIONNAIRE - PHQ9
2. FEELING DOWN, DEPRESSED OR HOPELESS: NOT AT ALL
1. LITTLE INTEREST OR PLEASURE IN DOING THINGS: NOT AT ALL
SUM OF ALL RESPONSES TO PHQ9 QUESTIONS 1 & 2: 0

## 2024-08-29 NOTE — PROGRESS NOTES
Chief Complaint   Patient presents with    Migraine     Pt here today for a follow up. Pt states her migraines are a little better     Subjective     Marichuy Guajardo is a 68 y.o. year old female for follow-up of migraine headaches.    She has headaches twice a week. They have improved with riboflavin and magnesium glycinate. The headaches can be severe but sometimes she has more mild headaches than in the past. Some of her headaches respond to tylenol. She takes sumatriptan 25mg daily PRN. Botox was not covered by her insurance and she has decided since her headaches have improved not to switch insurances with open enrollment. She tried the eletriptan with no benefit.    The Gepants do not work for her sister who has migraines so she was worried about that for her. She tried the TCAs in the past without significant difference. She takes nadolol 10mg daily PRN, uses about twice per month for tachycardia.      She has an increase in fatigue. She recently learned she was hypothyroid. She was recommended to start synthroid 25mcg daily.     She has triggers for her migraine including photophobia and osmophobia. This would cause her to get a severe migraine. She has had trouble traveling due to other people's colognes and smells triggering migraines.     Patient Active Problem List   Diagnosis    Anxiety disorder    Arteritis (CMS-HCC)    Asthma exacerbation ([facility])    Carotid artery dissection (Multi)    Cervicalgia    Colon polyp    Depression, major, single episode, mild (CMS-HCC)    Diverticulitis of colon    Hyperlipidemia    IBS (irritable bowel syndrome)    Lumbar disc disease    Lumbosacral neuritis    Migraine    Multiple pulmonary nodules    Psoriasis    Abdominal discomfort    Adjustment disorder with anxious mood    Arthralgia    Benign paroxysmal positional vertigo    Bilateral carotid artery stenosis    Carotid dissection, bilateral (Multi)    Closed fracture of fibula    Closed fracture of lateral  malleolus    Hyperglycemia    Lateral epicondylitis    Leukocytosis    Localized, primary osteoarthritis of hand    Muscle weakness    Occlusion of carotid artery    Pain of foot    Palpitations    Panic disorder without agoraphobia    Paresthesia of lower extremity    Positive antinuclear antibody    Social anxiety disorder    Tibialis tendinitis    Uterine fibroid    KIRSTEN (obstructive sleep apnea)    Neck pain on right side    Other fatigue       Objective   Neurological Exam  Mental Status  Awake, alert and oriented to person, place and time. Speech is normal.    Cranial Nerves  CN III, IV, VI: Extraocular movements intact bilaterally.  CN VII: Full and symmetric facial movement.  CN VIII: Hearing is normal.    Motor   Strength is 5/5 throughout all four extremities.    Gait  Casual gait is normal including stance, stride, and arm swing.    Physical Exam  Eyes:      Extraocular Movements: Extraocular movements intact.   Neurological:      Motor: Motor strength is normal.  Psychiatric:         Speech: Speech normal.       Assessment/Plan   Diagnoses and all orders for this visit:  Intractable chronic migraine without aura and without status migrainosus    Chronic migraine without aura, intractable: has tried beta blockers, TCAs without relief. Was going to try Botox but was cost prohibitive. Fortunately, magnesium and riboflavin for preventative therapy. Most of her triggers are related to cervical paraspinal muscle tension. Tried sumatriptan which works at low dose 25mg daily PRN. Tried eletriptan without benefit. [facility] and see if the cost has improved.     There are no Patient Instructions on file for this visit.

## 2024-09-04 ENCOUNTER — APPOINTMENT (OUTPATIENT)
Dept: PRIMARY CARE | Facility: CLINIC | Age: 69
End: 2024-09-04
Payer: MEDICARE

## 2024-09-04 VITALS
HEART RATE: 83 BPM | TEMPERATURE: 98.1 F | BODY MASS INDEX: 23.73 KG/M2 | RESPIRATION RATE: 16 BRPM | WEIGHT: 139 LBS | HEIGHT: 64 IN | OXYGEN SATURATION: 95 % | SYSTOLIC BLOOD PRESSURE: 138 MMHG | DIASTOLIC BLOOD PRESSURE: 86 MMHG

## 2024-09-04 DIAGNOSIS — E78.49 OTHER HYPERLIPIDEMIA: ICD-10-CM

## 2024-09-04 DIAGNOSIS — E03.8 OTHER SPECIFIED HYPOTHYROIDISM: Primary | ICD-10-CM

## 2024-09-04 DIAGNOSIS — R53.83 OTHER FATIGUE: ICD-10-CM

## 2024-09-04 PROCEDURE — 1036F TOBACCO NON-USER: CPT | Performed by: INTERNAL MEDICINE

## 2024-09-04 PROCEDURE — 1123F ACP DISCUSS/DSCN MKR DOCD: CPT | Performed by: INTERNAL MEDICINE

## 2024-09-04 PROCEDURE — 1159F MED LIST DOCD IN RCRD: CPT | Performed by: INTERNAL MEDICINE

## 2024-09-04 PROCEDURE — 3008F BODY MASS INDEX DOCD: CPT | Performed by: INTERNAL MEDICINE

## 2024-09-04 PROCEDURE — 1160F RVW MEDS BY RX/DR IN RCRD: CPT | Performed by: INTERNAL MEDICINE

## 2024-09-04 PROCEDURE — 99214 OFFICE O/P EST MOD 30 MIN: CPT | Performed by: INTERNAL MEDICINE

## 2024-09-04 ASSESSMENT — ENCOUNTER SYMPTOMS
FATIGUE: 0
JOINT SWELLING: 0
CONFUSION: 0
ARTHRALGIAS: 0
NAUSEA: 0
ABDOMINAL PAIN: 0
COUGH: 0
SLEEP DISTURBANCE: 0
WEAKNESS: 0
CHEST TIGHTNESS: 0
SORE THROAT: 0
DIZZINESS: 0
CONSTIPATION: 0
SHORTNESS OF BREATH: 0
DYSURIA: 0
ADENOPATHY: 0
PALPITATIONS: 0
DIARRHEA: 0
WHEEZING: 0
UNEXPECTED WEIGHT CHANGE: 0
VOMITING: 0
CHILLS: 0

## 2024-09-04 NOTE — PATIENT INSTRUCTIONS
Was nice seeing you today.  Continue same medication.  Have lab work done before next appointment if labs were ordered today.  Fu in 3 month.  Call/ contact our office with any concerns.    If you have labs or test done and you can't see the report in your chart or you didn't hear from us in 2 weeks after test/labs done , please, call our office for reports.  Please , do not assume that they were normal.    Any test results  and questions you might have , will be discussed at next visit -- please make sure to make a follow up appt after testing if reports are abnormal or you have questions.

## 2024-09-04 NOTE — ASSESSMENT & PLAN NOTE
Cardiac Risk Assessment  More then 15 minutes were spent assessing and discussing cardiovascular risk was and, if needed, lifestyle modifications recommended, including nutritional choices, exercise, and elimination of habits contributing to risk. Aspirin use/disuse was discussed following the guidelines below.     Low dose ASA ( mg) should be considered:  If you have prior Heart Attack/Stroke/Peripheral vascular disease:  Generally recommend daily low dose aspirin unless extremely high bleeding risk (e.g., gastrointestinal).     If you do not have prior Heart Attack/Stroke/Peripheral vascular disease:    Age < 70 and your 10-year cardiovascular disease risk is >20%, use low dose Aspirin   Age >=70: Do not use Aspirin for prevention  I have discussed the cardiovascular risk and behavioral modification, nutrition, exercise and elimination of habits contributing to risk. We agreed to a plan how to reduce the risk.  CV risk estimate calculates is  5.2  On lipitor

## 2024-09-04 NOTE — PROGRESS NOTES
Subjective   Marichuy Guajardo is a 68 y.o. female who presents for Follow-up and Results.  Patient is here to Follow up on Labs/Test Results.  Labs done 08.27.24  Abnormalities are:  Thyroid abn, I ordered synthroid 25 mcg every day, tsh in 6 weeks  The rest of the labs are ok, will rev/discuss all next visit.  Patient sated she hasn't start taking the medication you sent in because she wants talk about her treatment before starting it. (Synthroid 25 mcg)    Patient stated is taking magnesium doesn't know the dosage and a joint pill.    Labs and test reviewed with patient , all question answered, further evaluation, if needed , discussed.  Feels fatigue, has hair loss, afraid to start thyroid tx due to she has side effects in the past.    Review of Systems   Constitutional:  Negative for chills, fatigue and unexpected weight change.        Comment   HENT:  Negative for congestion, ear pain and sore throat.    Respiratory:  Negative for cough, chest tightness, shortness of breath and wheezing.    Cardiovascular:  Negative for palpitations and leg swelling.   Gastrointestinal:  Negative for abdominal pain, constipation, diarrhea, nausea and vomiting.   Genitourinary:  Negative for dysuria and urgency.   Musculoskeletal:  Negative for arthralgias and joint swelling.   Skin:  Negative for rash.   Neurological:  Negative for dizziness and weakness.   Hematological:  Negative for adenopathy.   Psychiatric/Behavioral:  Negative for confusion and sleep disturbance.    All other systems reviewed and are negative.      Objective   Physical Exam  Constitutional:       Appearance: Normal appearance.   HENT:      Head: Normocephalic and atraumatic.   Eyes:      Pupils: Pupils are equal, round, and reactive to light.   Cardiovascular:      Rate and Rhythm: Normal rate and regular rhythm.   Pulmonary:      Effort: Pulmonary effort is normal.      Breath sounds: Normal breath sounds.   Musculoskeletal:         General: Normal  "range of motion.      Cervical back: Normal range of motion and neck supple.   Skin:     General: Skin is warm.   Neurological:      General: No focal deficit present.      Mental Status: She is alert and oriented to person, place, and time.   Psychiatric:         Mood and Affect: Mood normal.         Behavior: Behavior normal.       /86 (BP Location: Left arm, Patient Position: Sitting, BP Cuff Size: Small adult)   Pulse 83   Temp 36.7 °C (98.1 °F)   Resp 16   Ht 1.626 m (5' 4\")   Wt 63 kg (139 lb)   SpO2 95%   BMI 23.86 kg/m²   Lab Results   Component Value Date    TSH 5.22 (H) 08/27/2024     Lab Results   Component Value Date    WBC 6.4 08/27/2024    HGB 13.4 08/27/2024    HCT 41.8 08/27/2024    MCV 91 08/27/2024     08/27/2024     Lab Results   Component Value Date    CHOL 168 08/27/2024    CHOL 177 05/17/2024    CHOL 151 11/07/2023     Lab Results   Component Value Date    HDL 47.2 08/27/2024    HDL 54.5 05/17/2024    HDL 46.9 11/07/2023     Lab Results   Component Value Date    LDLCALC 94 08/27/2024    LDLCALC 102 (H) 05/17/2024    LDLCALC 88 11/07/2023     Lab Results   Component Value Date    TRIG 133 08/27/2024    TRIG 105 05/17/2024    TRIG 82 11/07/2023     No components found for: \"CHOLHDL\"  Lab Results   Component Value Date    GLUCOSE 97 08/27/2024    CALCIUM 9.2 08/27/2024     08/27/2024    K 4.1 08/27/2024    CO2 32 08/27/2024     08/27/2024    BUN 16 08/27/2024    CREATININE 0.88 08/27/2024     Lab Results   Component Value Date    ALT 18 08/27/2024    AST 20 08/27/2024    ALKPHOS 43 08/27/2024    BILITOT 0.5 08/27/2024         Assessment/Plan   Problem List Items Addressed This Visit       Hyperlipidemia     Cardiac Risk Assessment  More then 15 minutes were spent assessing and discussing cardiovascular risk was and, if needed, lifestyle modifications recommended, including nutritional choices, exercise, and elimination of habits contributing to risk. Aspirin " use/disuse was discussed following the guidelines below.     Low dose ASA ( mg) should be considered:  If you have prior Heart Attack/Stroke/Peripheral vascular disease:  Generally recommend daily low dose aspirin unless extremely high bleeding risk (e.g., gastrointestinal).     If you do not have prior Heart Attack/Stroke/Peripheral vascular disease:    Age < 70 and your 10-year cardiovascular disease risk is >20%, use low dose Aspirin   Age >=70: Do not use Aspirin for prevention  I have discussed the cardiovascular risk and behavioral modification, nutrition, exercise and elimination of habits contributing to risk. We agreed to a plan how to reduce the risk.  CV risk estimate calculates is  5.2  On lipitor           Other fatigue    Other specified hypothyroidism - Primary     Has fatigue and hair loss, reluctant to start synthroid due to prior side effects.  Will wait and see how she feels and might start tx , otherwise will recheck in few month  To have sleep study done

## 2024-09-04 NOTE — ASSESSMENT & PLAN NOTE
Has fatigue and hair loss, reluctant to start synthroid due to prior side effects.  Will wait and see how she feels and might start tx , otherwise will recheck in few month  To have sleep study done

## 2024-09-09 ENCOUNTER — APPOINTMENT (OUTPATIENT)
Dept: SLEEP MEDICINE | Facility: CLINIC | Age: 69
End: 2024-09-09
Payer: MEDICARE

## 2024-09-09 VITALS
WEIGHT: 140 LBS | BODY MASS INDEX: 23.9 KG/M2 | TEMPERATURE: 98.2 F | OXYGEN SATURATION: 95 % | HEART RATE: 90 BPM | HEIGHT: 64 IN | SYSTOLIC BLOOD PRESSURE: 123 MMHG | DIASTOLIC BLOOD PRESSURE: 82 MMHG

## 2024-09-09 DIAGNOSIS — F45.8 BRUXISM (TEETH GRINDING): ICD-10-CM

## 2024-09-09 DIAGNOSIS — G47.19 EXCESSIVE DAYTIME SLEEPINESS: Primary | ICD-10-CM

## 2024-09-09 DIAGNOSIS — G47.33 OSA (OBSTRUCTIVE SLEEP APNEA): ICD-10-CM

## 2024-09-09 PROCEDURE — 1125F AMNT PAIN NOTED PAIN PRSNT: CPT | Performed by: NURSE PRACTITIONER

## 2024-09-09 PROCEDURE — G2211 COMPLEX E/M VISIT ADD ON: HCPCS | Performed by: NURSE PRACTITIONER

## 2024-09-09 PROCEDURE — 1160F RVW MEDS BY RX/DR IN RCRD: CPT | Performed by: NURSE PRACTITIONER

## 2024-09-09 PROCEDURE — 1123F ACP DISCUSS/DSCN MKR DOCD: CPT | Performed by: NURSE PRACTITIONER

## 2024-09-09 PROCEDURE — 99204 OFFICE O/P NEW MOD 45 MIN: CPT | Performed by: NURSE PRACTITIONER

## 2024-09-09 PROCEDURE — 1036F TOBACCO NON-USER: CPT | Performed by: NURSE PRACTITIONER

## 2024-09-09 PROCEDURE — 1159F MED LIST DOCD IN RCRD: CPT | Performed by: NURSE PRACTITIONER

## 2024-09-09 PROCEDURE — 3008F BODY MASS INDEX DOCD: CPT | Performed by: NURSE PRACTITIONER

## 2024-09-09 ASSESSMENT — SLEEP AND FATIGUE QUESTIONNAIRES
HOW LIKELY ARE YOU TO NOD OFF OR FALL ASLEEP WHILE WATCHING TV: MODERATE CHANCE OF DOZING
HOW LIKELY ARE YOU TO NOD OFF OR FALL ASLEEP WHILE SITTING AND READING: MODERATE CHANCE OF DOZING
SATISFACTION_WITH_CURRENT_SLEEP_PATTERN: SATISFIED
HOW LIKELY ARE YOU TO NOD OFF OR FALL ASLEEP WHILE SITTING AND TALKING TO SOMEONE: WOULD NEVER DOZE
SITING INACTIVE IN A PUBLIC PLACE LIKE A CLASS ROOM OR A MOVIE THEATER: MODERATE CHANCE OF DOZING
SLEEP_PROBLEM_INTERFERES_DAILY_ACTIVITIES: SOMEWHAT
DIFFICULTY_STAYING_ASLEEP: MILD
SLEEP_PROBLEM_NOTICEABLE_TO_OTHERS: NOT AT ALL NOTICEABLE
ESS-CHAD TOTAL SCORE: 10
HOW LIKELY ARE YOU TO NOD OFF OR FALL ASLEEP WHILE SITTING QUIETLY AFTER LUNCH WITHOUT ALCOHOL: WOULD NEVER DOZE
HOW LIKELY ARE YOU TO NOD OFF OR FALL ASLEEP WHEN YOU ARE A PASSENGER IN A CAR FOR AN HOUR WITHOUT A BREAK: SLIGHT CHANCE OF DOZING
HOW LIKELY ARE YOU TO NOD OFF OR FALL ASLEEP WHILE LYING DOWN TO REST IN THE AFTERNOON WHEN CIRCUMSTANCES PERMIT: MODERATE CHANCE OF DOZING
WAKING_TOO_EARLY: MILD
HOW LIKELY ARE YOU TO NOD OFF OR FALL ASLEEP IN A CAR, WHILE STOPPED FOR A FEW MINUTES IN TRAFFIC: SLIGHT CHANCE OF DOZING
WORRIED_DISTRESSED_DUE_TO_SLEEP: SOMEWHAT
DIFFICULTY_FALLING_ASLEEP: MILD

## 2024-09-09 ASSESSMENT — ENCOUNTER SYMPTOMS
OCCASIONAL FEELINGS OF UNSTEADINESS: 0
DEPRESSION: 0
LOSS OF SENSATION IN FEET: 0

## 2024-09-09 ASSESSMENT — PATIENT HEALTH QUESTIONNAIRE - PHQ9
2. FEELING DOWN, DEPRESSED OR HOPELESS: NOT AT ALL
SUM OF ALL RESPONSES TO PHQ9 QUESTIONS 1 AND 2: 0
1. LITTLE INTEREST OR PLEASURE IN DOING THINGS: NOT AT ALL

## 2024-09-09 ASSESSMENT — PAIN SCALES - GENERAL: PAINLEVEL: 5

## 2024-09-09 NOTE — PATIENT INSTRUCTIONS
[facility] Sleep Medicine  [address] [place] [address]       NAME: Marichuy Guajardo   DATE: 09/09/24    Your Sleep Provider Today: JEANNIE Alonso  Your Primary Care Physician: Helen Houston MD       DIAGNOSIS:   1. KIRSTEN (obstructive sleep apnea)  Referral to Adult Sleep Medicine          Thank you for coming to the Sleep Medicine Clinic today! Your sleep medicine provider today was: JEANNIE Alonso Below is a summary of your treatment plan, other important information, and our contact numbers:      TREATMENT PLAN     - Follow-up in 1-1.5  months.  - If not already done, sign up for 'My Chart' and send prescription requests or messages through this  - If you have old CPAP machine, bring to office visit   - Try positional therapy, mouth tape, chin strap, or nasal dilator (mute)     Oral Appliance Therapy    Oral appliance therapy is a dental device that can be fabricated by a dental sleep medicine specialist. We discussed that we will refer you for an evaluation.    Please schedule an appointment with dentistry to evaluate you for an oral appliance to treat your sleep apnea. Consider the following specialists in the area:     Dr. Goldie Mcdonough  [address] [id number]  Phone: [phone]  Website: [email or web address]    Dr. Shon Hernandez  /[facility]: [phone]    Dr. Sergio Manzano  [facility]   [phone]    Dr. Pradip Crump  [place], [place]: [phone]    Dr. Sha Thapa  [place], [place]: [phone]    Dr. Triston Dinh, [place]: [phone]    Dr. Reyna Pearl, [phone]    Dr. Eddie Davenport, [place]: [phone]    Dr. Froy Gates, [place]: [phone]    Dr. Christiano Cortes, [place]: [phone]    Dr. Danyel Jerez, [place]: [phone]    [facility]:  Dr. Sacha Cabrera    Oral  appliance therapy is typically covered by your medical insurance as sleep apnea is a medical diagnoses and not a dental diagnosis. You can confirm coverage by calling your medical insurance and providing them with the following medical codes:  Diagnosis code: Obstructive Sleep Apnea G47.33  Procedure code: N34761    After your appliance is made and adjusted, we like to make sure it is treating your sleep apnea effectively. Please return to our clinic at that time for follow up.       INOSAPOS    Positional Therapy For Your Sleep Apnea  Example of Devices    You have what is called positional sleep apnea. This means that your sleep apnea is better if you sleep on your side and worse if you sleep on your back. Positional therapy for sleep apnea is only helpful if you can make sure that you are sleeping on your side. There are several ways to make sure you are sleeping on your back as shown below:    1. Tennis ball method       Sew  a pocket in the back of your pajama shirt and put a tennis ball in the pocket.   2. Remateee Bumper belt or       Anti-snore Shirt                   www.antisnoreshirt.com (Cost:? $100 )        This company makes several devices to assist patients in staying on their back - one that is modeled like a shirt with a foam or air bumper to prevent rolling over or a belt that is worn around mid-chest level.     3. Slumberbump       www.slumberbump.com (Cost: ? $70)     This device is a rectangular air bag (which you blow up) that is worn around the waist to prevent you from rolling over during the night.   4. Zzoma Positional Sleeper      www.[employer].com (Cost:? $100)       This is device is worn as a belt around the mid-chest. Also contains a foam material that is an enclosure around the back.   5. Nightshift positioner  Http://nightshifttherapy.com/ (Cost:? $350-$400       This device is worn around the neck and provides a vibration to the back of the neck when you move to your back so as to  remind you to shift back on to your side.   6. Nightshift positioner    https://[email or web address]-e/hs/sleep-apnea-therapy/[email or web address]-was-just-diagnosed-[email or web address]-sleep-apnea/cpap-[email or web address].html    (Cost:? $500-600)     This device is worn around the waist and provides a vibration to your chest/stomachwhen you move to your back so as to remind you to shift back on to your side.                 IMPORTANT INFORMATION     Call 911 for medical emergencies.  Our offices are generally open from Monday-Friday, 9 am - 5 pm.  If you need to get in touch with me, you may either call me/my team (number is below) or you can use [employer].  If a referral for a test, for CPAP, or for another specialist was made, and you have not heard about scheduling this within a week, please call scheduling at [phone]).  If you are unable to make your appointment for clinic or an overnight study, kindly call the office at least 48 hours in advance to cancel and reschedule.  If you are on CPAP, please bring your device's card or the device to each clinic appointment.   There are no supporting services by either the sleep doctors or their staff on weekends and Holidays, or after 5 PM on weekdays.     PRESCRIPTIONS     We require 7 days advanced notice for prescription refills. If we do not receive the request in this time, we cannot guarantee that your medication will be refilled in time.      IMPORTANT PHONE NUMBERS     [facility]: [phone]  [employer]): [phone]  [employer]): [phone]  [facility] (DME): [phone]    CONTACTING YOUR SLEEP MEDICINE PROVIDER AND SLEEP TEAM      For issues with your machine or mask interface, please call your DME provider first. "[employer] stands for durable medical company. "[employer] is the company who provides you the machine and/or PAP supplies / accessories.   To schedule, cancel, or reschedule SLEEP STUDY APPOINTMENTS, please call the Main Phone Line  "at [phone] - option 3.   To schedule, cancel, or reschedule CLINIC APPOINTMENTS, you can do it in \"MyChart\", call [phone] for [facility] office , [phone] for [place] [facility]. office to speak with my on site staff, or call the Main Phone Line at [phone] - option 2  For CLINICAL QUESTIONS or MEDICATION REFILLS, please call direct line for Adult Sleep Nurses at [phone].   Lastly, you can also send a message directly to your provider through \"My Chart\", which is the email service through your  Records Account: [email or web address]     Adult Sleep Nurses (Jade Mendez, ALEXANDRE and Samantha Quiles RN):  For clinical questions and refilling prescriptions: [phone]  Email sleep diaries and other documents at: [email or web address]    Office locations for Shirley Beatty NP:    [address]   [address]  [place] [address] [phone]    [address]  [address]  [place] [address] [phone]      OUR SLEEP TESTING LOCATIONS     Our team will contact you to schedule your sleep study, however, you can contact us as follow:  Main Phone Line (scheduling only): [phone]), option 3    Sleep Testing Locations:   [place] (18 years and older): [address] [place] [address], 2nd floor   [place] (18 years and older): [address] [facility]; 4th floor  After hours line: [phone]  [facility] (18 years and older) at [place]: [address] [facility]  After hours line: [phone]   [facility] (Main campus: All ages): [facility], 6th floor. After hours line: [phone]   [place] (5 years and older; younger considered on case-by-case basis): [address] [facility]; [employer] [facility] [address], [address]. Scheduling  After hours line: [phone]       Here at [facility], we wish you a restful sleep!    Your sleep medicine provider for this visit was: Shirley Beatty, APRN-CNP   "

## 2024-09-16 NOTE — PROGRESS NOTES
Chief Complaint:   No chief complaint on file.     History Of Present Illness:    Marichuy Guajardo is a 68 y.o. female with a history of dyslipidemia, carotid dissection, palpitations, anxiety, GERD, vertigo and dizziness here for follow-up.     Denies any exertional chest pain or shortness of breath.  Has developed a pain in the back of her right side of her neck.  If she looks up or down it is painful.  It is described as a sharp pain which is intense.  When she looks back normally it goes away.  There are no other neurologic symptoms such as blurred vision or change in her hearing.    She stopped her daily aspirin because that she was having GI upset.  She has been taking Pepcid which seems to help.    She has been tolerating taking atorvastatin 20 mg daily with co-Q10.     She was evaluated in the past and had a CTA of the head and neck. She was found to have a carotid dissection. She saw a vascular surgeon who recommended continuing to observe. She follows now with a vascular specialist through the [facility] system.      Treadmill nuclear stress test 6/25/2013: No evidence of ischemia or scar, EF 69%.     Stress echocardiogram 3/17/2011: No evidence of ischemia. Resting EF 55 to 60% increasing to greater than 70% at peak exercise.      Allergies:  Ciprofloxacin, Codeine, Other, Statins-hmg-coa reductase inhibitors, Sulfa (sulfonamide antibiotics), and Pantoprazole    Outpatient Medications:  Current Outpatient Medications   Medication Instructions    atorvastatin (LIPITOR) 20 mg, oral, Daily    cetirizine (ZYRTEC) 10 mg, oral, Daily    clobetasol (Temovate) 0.05 % external solution 1 Application, topical (top), 2 times daily, APPLY AND GENTLY MASSAGE INTO AFFECTED AREA(S)    clonazePAM (KLONOPIN) 0.5 mg, oral, Daily    co-enzyme Q-10 50 mg, oral, Daily    famotidine (Pepcid) 20 mg tablet 1 tablet, oral, 3 times daily    levothyroxine (SYNTHROID, LEVOXYL) 25 mcg, oral, Daily, Take on an empty stomach  "at the same time each day, either 30 to 60 minutes prior to breakfast    multivitamin tablet 1 tablet, oral, Daily    nadolol (Corgard) 20 mg tablet 1 tablet, oral, Daily, States takes 5 m-10 mg once a week    sertraline (ZOLOFT) 25 mg, oral, Daily, 50mg QD- per specialist dr unger    SUMAtriptan (IMITREX) 25 mg, oral, Daily PRN, May repeat dose once in 2 hours if no relief.  Do not exceed 2 doses in 24 hours.       Last Recorded Vitals:  Visit Vitals  /70 (BP Location: Left arm, Patient Position: Sitting)   Pulse 88   Ht 1.626 m (5' 4\")   Wt 62.6 kg (138 lb)   SpO2 96%   BMI 23.69 kg/m²   OB Status Postmenopausal   Smoking Status Never   BSA 1.68 m²      LASTWT(3):   Wt Readings from Last 3 Encounters:   09/17/24 62.6 kg (138 lb)   09/09/24 63.5 kg (140 lb)   09/04/24 63 kg (139 lb)       Physical Exam:  In general: alert and in no acute distress.   HEENT: Carotid upstrokes normal with no bruits. JVP is normal.   Pulmonary: Clear to auscultation bilaterally.  Cardiovascular: S1, S2, regular. No appreciable murmurs, rubs or gallops.   Lower extremities: Warm. 2+ distal pulses. No edema.         Last Labs:  CBC -  Recent Labs     08/27/24  0748 10/17/23  1213 11/20/22  0940   WBC 6.4 6.0 10.7   HGB 13.4 13.2 13.3   HCT 41.8 41.9 42.0    283 282   MCV 91 93 92       CMP -  Recent Labs     08/27/24  0748 05/17/24  0931 10/17/23  1213 11/20/22  0940    142 140 137   K 4.1 4.3 4.3 4.3    101 104 102   CO2 32 30 30 28   ANIONGAP 9* 15 10 11   BUN 16 17 18 24*   CREATININE 0.88 0.96 0.89 0.99   EGFR 72 65 72  --    MG 2.23  --   --  2.10     Recent Labs     08/27/24  0748 10/17/23  1213 07/17/23  0850   ALBUMIN 4.1 4.2 4.1   ALKPHOS 43 43 41   ALT 18 17 25   AST 20 18 22   BILITOT 0.5 0.3 0.4       LIPID PANEL -   Recent Labs     08/27/24  0748 05/17/24  0931 11/07/23  0759 07/17/23  0850 04/04/22  0902 12/21/20  0806   CHOL 168 177 151 225* 211* 211*   LDLCALC 94 102* 88  --   --   --    LDLF  --  "  --   --  154* 135* 144*   HDL 47.2 54.5 46.9 51.7 52.0 49.0   TRIG 133 105 82 99 121 91       Recent Labs     12/21/20  0806 09/18/20  0805   HGBA1C 5.7 5.9           Assessment/Plan   1) palpitations: Has not recurred for quite some time.  Continue to observe.     2) dyslipidemia: LDL much better on statin therapy.  Continue with current doses of the atorvastatin and co-Q10.     3) carotid dissection: Follows with vascular surgery at the [facility].  Once again gave her Dr. Tyson's information as she is interested in establishing with someone on the [place] side [facility].     4) follow-up: As needed      Josh Velasco MD

## 2024-09-17 ENCOUNTER — OFFICE VISIT (OUTPATIENT)
Dept: CARDIOLOGY | Facility: CLINIC | Age: 69
End: 2024-09-17
Payer: MEDICARE

## 2024-09-17 VITALS
DIASTOLIC BLOOD PRESSURE: 70 MMHG | SYSTOLIC BLOOD PRESSURE: 122 MMHG | WEIGHT: 138 LBS | HEART RATE: 88 BPM | HEIGHT: 64 IN | BODY MASS INDEX: 23.56 KG/M2 | OXYGEN SATURATION: 96 %

## 2024-09-17 DIAGNOSIS — E78.5 HYPERLIPIDEMIA, UNSPECIFIED HYPERLIPIDEMIA TYPE: ICD-10-CM

## 2024-09-17 DIAGNOSIS — R00.2 PALPITATIONS: Primary | ICD-10-CM

## 2024-09-17 DIAGNOSIS — I77.71 CAROTID DISSECTION, BILATERAL (MULTI): ICD-10-CM

## 2024-09-17 DIAGNOSIS — I77.71 CAROTID ARTERY DISSECTION (MULTI): ICD-10-CM

## 2024-09-17 PROCEDURE — 1159F MED LIST DOCD IN RCRD: CPT | Performed by: INTERNAL MEDICINE

## 2024-09-17 PROCEDURE — 1160F RVW MEDS BY RX/DR IN RCRD: CPT | Performed by: INTERNAL MEDICINE

## 2024-09-17 PROCEDURE — 1123F ACP DISCUSS/DSCN MKR DOCD: CPT | Performed by: INTERNAL MEDICINE

## 2024-09-17 PROCEDURE — 3008F BODY MASS INDEX DOCD: CPT | Performed by: INTERNAL MEDICINE

## 2024-09-17 PROCEDURE — 99214 OFFICE O/P EST MOD 30 MIN: CPT | Performed by: INTERNAL MEDICINE

## 2024-09-17 PROCEDURE — 1036F TOBACCO NON-USER: CPT | Performed by: INTERNAL MEDICINE

## 2024-09-17 PROCEDURE — 1126F AMNT PAIN NOTED NONE PRSNT: CPT | Performed by: INTERNAL MEDICINE

## 2024-09-17 ASSESSMENT — PAIN SCALES - GENERAL: PAINLEVEL: 0-NO PAIN

## 2024-09-17 ASSESSMENT — ENCOUNTER SYMPTOMS
LOSS OF SENSATION IN FEET: 0
DEPRESSION: 0
OCCASIONAL FEELINGS OF UNSTEADINESS: 0

## 2024-09-19 DIAGNOSIS — M54.2 CERVICALGIA: Primary | ICD-10-CM

## 2024-09-25 ENCOUNTER — DOCUMENTATION (OUTPATIENT)
Dept: SLEEP MEDICINE | Facility: CLINIC | Age: 69
End: 2024-09-25
Payer: MEDICARE

## 2024-09-25 NOTE — PROGRESS NOTES
Patient stopped by office with old CPAP machine; verified settings; 4-13 CWP. Patient fit with Chris FFM size XS, sample mask provided today. Bring machine to follow up visit.

## 2024-09-26 ENCOUNTER — APPOINTMENT (OUTPATIENT)
Dept: PRIMARY CARE | Facility: CLINIC | Age: 69
End: 2024-09-26
Payer: MEDICARE

## 2024-10-14 ENCOUNTER — APPOINTMENT (OUTPATIENT)
Dept: PHYSICAL THERAPY | Facility: CLINIC | Age: 69
End: 2024-10-14
Payer: MEDICARE

## 2024-10-16 NOTE — PROGRESS NOTES
Patient: Marichuy Guajardo  : 1955 AGE: 68 y.o. SEX:female   MRN: [id number]   Provider: QUIRINO Alonso-CNP     Location [facility]   Service Date: 10/23/2024     PCP: Helen Houston MD   Referred by: No ref. provider found          [facility]  Follow Up Visit Note      HISTORY OF PRESENT ILLNESS     Marichuy Guajardo is a 68 y.o. female with a h/o KIRSTEN, Anxiety, and GERD  who presents to [facility].      10/23/24: Brought PAP machine into office visit today ('s old machine). Attempted PAP with Chris FFM, unable to tolerate. Would like to succeed with PAP therapy but unsure she will be able to tolerate. Biggest concerns are loud snoring and daytime fatigue. ----> Fit with N30 mask, PAP acclimation     24: NPV with concerns of KIRSTEN management. Reports loud snoring ( sleeps in separate bedroom), un refreshed sleep, and daytime fatigue. Recently dx with hypothyroidism, has not start on synthroid. Uses mouth guard for teeth grinding.  is on a CPAP with FFM and patient does not think she would be able to tolerate. Has tried chin strap but found uncomfortable.     SLEEP STUDY HISTORY (personally reviewed raw data such as interpretation report, data sheet, hypnogram, and titration table if available and applicable)  - HSAT 2023- showing no KIRSTEN based on CMS criteria with MANISH 4% 3.6, SpO2 rocael 80% SpO2 <=88% for 25min    SLEEP-WAKE SCHEDULE    Sleep Patterns: She does not have a usual bed partner. In terms of the patient's sleep/wake cycle, she generally gets into bed at approximately 11 PM.  She reads in bed, and her latency to sleep onset after lights out is 5min. During the night, the patient generally awakens 2-3 times nightly. These awakenings are usually brief in duration. Final wake time on weekday mornings is around 8 AM.    Compared to weekdays, the patient's sleep schedule is  similar  on the weekends.    Breathing during sleep: snoring, gasping/choking for air, nasal congestion, and teeth grinding (uses mouth guard)  Behaviors at night: No   Sleep paralysis: No   Hypnogogic or hypnopompic hallucinations: No   Cataplexy: No     Leg symptoms and timing:  - Sensations: Patient does not have unusual sensations in their extremities that cause an urge to move them   - Movement: Patient has not been told that their legs kick or jerk during sleep    Daytime Symptoms:  On awakening patient reports: wake unrefreshed, feels sleepy, morning headaches, morning dry mouth, and hard to get out of bed  Patient report some daytime symptoms including: DAYTIME SYMPTOMS: reports sleep inertia difficulty with memory or concentration during the day    Sleep environment:  Preferred sleep position: side  Room is dark: Yes  Room is quiet: Yes  Room is cool: Yes  Bed comfort: good    SLEEP HABITS  Caffeine consumption: Yes, 1-2 cups/day  Alcohol consumption: Yes, rarely (occasional)  Smoking: No  Marijuana: No  Sleep aids: clonazepam 0.25mg     WEIGHT: gained 8 lbs in <1 month      ESS: 10      REVIEW OF SYSTEMS     All other systems have been reviewed and are negative.    ALLERGIES     Allergies   Allergen Reactions    Ciprofloxacin Unknown    Codeine Other    Other Headache     Daphne,perfumes - in general all - did se the allergologist    Headaches, losing voice, sometimes chocking while trying to talk     Statins-Hmg-Coa Reductase Inhibitors Unknown    Sulfa (Sulfonamide Antibiotics) Unknown    Pantoprazole Unknown     Muscle weakness       MEDICATIONS     Current Outpatient Medications   Medication Sig Dispense Refill    atorvastatin (Lipitor) 20 mg tablet Take 1 tablet (20 mg) by mouth once daily. 30 tablet 11    cetirizine (ZyrTEC) 10 mg tablet Take 1 tablet (10 mg) by mouth once daily.      clobetasol (Temovate) 0.05 % external solution Apply 1 Application topically 2 times a day. APPLY AND GENTLY MASSAGE INTO  "AFFECTED AREA(S)      clonazePAM (KlonoPIN) 0.5 mg tablet Take 1 tablet (0.5 mg) by mouth once daily. 30 tablet 0    co-enzyme Q-10 50 mg capsule Take 1 capsule (50 mg) by mouth once daily. 30 capsule 11    famotidine (Pepcid) 20 mg tablet Take 1 tablet (20 mg) by mouth 3 times a day.      levothyroxine (Synthroid, Levoxyl) 25 mcg tablet Take 1 tablet (25 mcg) by mouth early in the morning.. Take on an empty stomach at the same time each day, either 30 to 60 minutes prior to breakfast 30 tablet 11    multivitamin tablet Take 1 tablet by mouth once daily.      nadolol (Corgard) 20 mg tablet Take 1 tablet (20 mg) by mouth once daily. States takes 5 m-10 mg once a week      sertraline (Zoloft) 25 mg tablet Take 1 tablet (25 mg) by mouth once daily. 50mg QD- per specialist dr order      SUMAtriptan (Imitrex) 25 mg tablet Take 1 tablet (25 mg) by mouth once daily as needed for migraine. May repeat dose once in 2 hours if no relief.  Do not exceed 2 doses in 24 hours. 9 tablet 11     No current facility-administered medications for this visit.       PAST HISTORIES     PERTINENT PAST MEDICAL HISTORY: See HPI    PERTINENT PAST SURGICAL HISTORY for Sleep Medicine:  tonsillectomy and adenoidectomy and sinus surgery      PERTINENT FAMILY HISTORY for Sleep Medicine:  sleep apnea on PAP- brother   loud snoring- father     PERTINENT SOCIAL HISTORY:  She  reports that she has never smoked. She has never used smokeless tobacco. She reports current alcohol use. She reports that she does not use drugs. She currently lives with spouse.    Active Problems, Allergy List, Medication List, and PMH/PSH/FH/Social Hx have been reviewed and reconciled in chart. No significant changes unless documented in the pertinent chart section. Updates made when necessary.     PHYSICAL EXAM     VITAL SIGNS: /74   Pulse 100   Temp 36 °C (96.8 °F)   Resp 18   Ht 1.626 m (5' 4\")   Wt 64 kg (141 lb)   SpO2 93%   BMI 24.20 kg/m²     CURRENT " "WEIGHT:   Vitals:    10/23/24 0831   Weight: 64 kg (141 lb)     PREVIOUS WEIGHTS:  Wt Readings from Last 3 Encounters:   10/23/24 64 kg (141 lb)   09/17/24 62.6 kg (138 lb)   09/09/24 63.5 kg (140 lb)     Constitutional: Alert and oriented, cooperative, no obvious distress   HEENT: Non icteric or anemic, EOM WNL bilaterally   Neck: Supple, no JVD, no goiter, no adenopathy, no rigidity     RESULTS/DATA     No results found for: \"IRON\", \"TRANSFERRIN\", \"IRONSAT\", \"TIBC\", \"FERRITIN\"    Bicarbonate   Date Value Ref Range Status   08/27/2024 32 21 - 32 mmol/L Final       ASSESSMENT/PLAN     Ms. Guajardo is a 68 y.o. female and She was referred to the [facility] for evaluation of KIRSTEN    Problem List, Orders, Assessment, Recommendations:    #KIRSTEN  - HSAT 11/9/2023- showing no KIRSTEN based on CMS criteria with MANISH 4% 3.6, SpO2 rocael 80% SpO2 <=88% for 25min    10/23/24:  - Brought in her husbands old CPAP machine (Airsense 10) has tried PAP but unable to tolerate with Chris FFM  - Current setting changed to 4-10 CWP, RAMP turned on. Fit with N30 nasal small, patient applied PAP and worked with acclimation/breathing  - Patient struggled to tolerate in office today, difficulty breathing out against/synching her breathing  - Will take further acclimation and desensitization     9/9/24:  - Repeat in lab PSG or purchase out of pocket   -  may have old machine she would first like to try, advised to bring into office to verify and adjust setting  - considering OAT, provided list of dentists  - positional therapy, mouth tape, chin strap, nasal dilators   - diet, exercise, and weight loss were emphasized today in clinic, as were non-supine sleep, avoiding alcohol in the late evening, and driving or operating heavy machinery when sleepy. Patient verbalized understanding.     #POOR SLEEP HYGIENE / EXCESSIVE DAYTIME SLEEPINESS / FATIGUE + SLEEP DISTURBANCES  - likely due to combination of poor sleep " hygiene, depression, anxiety, untreated sleep apnea, chronic pain, and nocturia. Meds may be a contributing factor as well.  - discussed with patient good sleep hygiene  - will reevaluate after successful testing and treatment of KIRSTEN     #Bruxism  - Continue mouth guard       All of patient's questions were answered. She verbalizes understanding and agreement with my assessment and plan.    Disposition    Return to clinic in 2 months     I personally spent ** minutes today (exclusive of procedures) providing care for this patient, including preparation, face to face time, EMR documentation and other services such as review of medical records, diagnostic results, patient education, counseling, and coordination of care.

## 2024-10-21 ENCOUNTER — APPOINTMENT (OUTPATIENT)
Dept: SLEEP MEDICINE | Facility: CLINIC | Age: 69
End: 2024-10-21
Payer: MEDICARE

## 2024-10-21 ENCOUNTER — EVALUATION (OUTPATIENT)
Dept: PHYSICAL THERAPY | Facility: CLINIC | Age: 69
End: 2024-10-21
Payer: MEDICARE

## 2024-10-21 DIAGNOSIS — M54.2 NECK PAIN: Primary | ICD-10-CM

## 2024-10-21 DIAGNOSIS — M54.2 CERVICALGIA: ICD-10-CM

## 2024-10-21 PROCEDURE — 97161 PT EVAL LOW COMPLEX 20 MIN: CPT | Mod: GP

## 2024-10-21 PROCEDURE — 97110 THERAPEUTIC EXERCISES: CPT | Mod: GP

## 2024-10-21 ASSESSMENT — PAIN - FUNCTIONAL ASSESSMENT: PAIN_FUNCTIONAL_ASSESSMENT: 0-10

## 2024-10-21 ASSESSMENT — PAIN SCALES - GENERAL: PAINLEVEL_OUTOF10: 3

## 2024-10-21 NOTE — PROGRESS NOTES
Physical Therapy Evaluation and Treatment      Patient Name: Marichuy Guajardo  MRN: [id number]  Today's Date: 10/21/2024    Time Entry:   Time Calculation  Start Time: 1411  Stop Time: 1445  Time Calculation (min): 34 min  Visit Number: 1  Approved Visits: CORRINE [place] required:no  Medicare Certification Date Range: 10/21/2024 to 1/19/2025      Assessment:  PT Assessment  Rehab Prognosis: Good  Evaluation/Treatment Tolerance: Patient tolerated treatment well   Patient presents with chief complaint of neck pain and headaches that have been present for quite some time, but became aggravated in September with no specific inciting event. Feels better now, but still noticing some pain and limited mobility. Patient notes that looking up, prolonged sitting tends to aggravate her sxs. Of note, she does have a history of B carotid dissection approximately 15 years ago -- no surgical intervention at the time and has been following with vascular since with no issues. We started her on some gentle cervicothoracic mobility and stressed importance of not pushing it too much with ROM exercises. Patient demonstrates decreased cervical/thoracic ROM, decreased cervicothoracic strength, impaired posture, and increased neck pain, which limits her current functional ability. Patient would likely benefit from skilled outpatient PT in order to address the above deficits and return to PLOF.     Plan:  OP PT Plan  Treatment/Interventions: Dry needling, Cryotherapy, Hot pack, Manual therapy, Neuromuscular re-education, Self care/ home management, Taping techniques, Therapeutic activities, Therapeutic exercises  PT Plan: Skilled PT  PT Frequency: 1 time per week  Duration: 8-10 weeks  Certification Period Start Date: 10/21/24  Certification Period End Date: 01/19/25  Number of Treatments Authorized: MN  Rehab Potential: Good  Plan of Care Agreement: Patient    Current Problem:   1. Neck pain  Follow Up In Physical Therapy      2. Cervicalgia   Referral to Physical Therapy          Subjective    General:  General  Reason for Referral: chronic neck pain  Referred By: Dr. Houston  Chief complaint: neck pain that has been present on and off for awhile, but became aggravated in Mid-September -- no specific inciting event.  States that things seem better since initial onset, but wants to learn some exercises to prevent this from happening again. Woke up with significant pain and noticed limited mobility. Pain will radiate down her lateral neck and into her upper traps B. Has done some PT in the past with relief. R side seems worse than the L. Receives massage therapy and this seems to help. Has difficulty with looking up due to pain and stiffness. Has difficulty with prolonged sitting. Feels a lot of tension in the neck. Will notice headaches that kind of radiate all over her head. Also has significant history of migraines -- few times per week and manages with medication. Sometimes feels that she needs to lean forward and support her head when sitting. Occasionally notices some pain recently on the R lateral arm. Will also feel more pain in the shoulder blade area R>L. Denies any sensation changes. Notes that prolonged driving or sitting on a plane can be problematic. Not affecting sleep right now, but can sometimes have trouble finding comfortable position. Does like to do a regular walking program. Hx of carotid dissection B 15 years ago -- had no surgical intervention for this and has been following with vascular with no issues. No imaging in system  PMHx: anxiety, asthma, depression, IBS, HLD, B carotid artery stenosis with hx of carotid dissection, migraines  : verified with patient   Social/Environmental Factors: Retired and lives at home with spouse  PLOF: independent without restrictions  Medications: see chart for full medication list  Patient goals for PT: reduce pain, improve mobility  Medical Screening: Patient denies recent infection/illness,  headaches, chest pain, SOB, difficulty speaking/eating/swallowing, recent trauma, difficulty walking, hand weakness, hx of cancer, weight change, unrelenting pain at night, Ds and Ns, clumsiness/dropping objects   Precautions:  Precautions  Precautions Comment: none; low fall risk  Pain:  Pain Assessment  Pain Assessment: 0-10  0-10 (Numeric) Pain Score: 3    Objective   Eval Objective:  Cervical AROM:    Flexion: 100% slight (+)   Extension: 50% (+)   Rotation: R+L: 60% (+)   SB: R+L: 30% (+)   Cervical rotation PROM: similar restriction to active  Thoracic ROM:    Extension: 50%   Rotation: R+L: 50% (+)  Neuro Exam:  Dermatome exam: WNL  Myotome exam: WNL  Reflexes: not assessed this date   Shoulder ROM: symmetrical B with no reproduction of familiar sxs in all planes  MMT:   Flexion: R:5/5L:5/5   Abduction: R:4+/5 L:4+/5   ER(0*): R:5/5 L:5/5   IR(0*): R:5/5 L:5/5   Lower trap(seated): R:4/5 L:4/5   Middle trap(seated): R:4/5 L:4/5  Palpation: hypertonicity and TTP noted B suboccipitals, UT, scalenes, levator  Observation: elevated and anteriorly tilted scapulae; slight forward head posture    Outcome Measures:  Other Measures  Oswestry Disablity Index (LAI): 15     Treatments:  Seated RR  Supine cervical rotation gentle stretch  Supine UT gentle stretch  Seated scap retractions -- cues to limit UT compensation  Band pull aparts RTB    EDUCATION:  Outpatient Education  Individual(s) Educated: Patient  Education Provided: Home Exercise Program  Risk and Benefits Discussed with Patient/Caregiver/Other: yes  Patient/Caregiver Demonstrated Understanding: yes  Plan of Care Discussed and Agreed Upon: yes  Patient Response to Education: Patient/Caregiver Verbalized Understanding of Information  Education Comment: review HEP    Goals:  Patient will demonstrate improvement in NDI by at least 7.5 points in order to meet MCID  Patient will demonstrate full cervical/thoracic ROM without pain or compensation  Patient will  demonstrate at least 4+/5 strength of shoulder and periscapular musculature  Patient will demonstrate I with HEP  Patient will be able to drive without pain or compensation   Patient will be able to sit >1hour without pain or compensation

## 2024-10-23 ENCOUNTER — APPOINTMENT (OUTPATIENT)
Dept: SLEEP MEDICINE | Facility: CLINIC | Age: 69
End: 2024-10-23
Payer: MEDICARE

## 2024-10-23 VITALS
TEMPERATURE: 96.8 F | SYSTOLIC BLOOD PRESSURE: 110 MMHG | HEART RATE: 100 BPM | WEIGHT: 141 LBS | DIASTOLIC BLOOD PRESSURE: 74 MMHG | OXYGEN SATURATION: 93 % | HEIGHT: 64 IN | BODY MASS INDEX: 24.07 KG/M2 | RESPIRATION RATE: 18 BRPM

## 2024-10-23 DIAGNOSIS — R53.83 OTHER FATIGUE: ICD-10-CM

## 2024-10-23 DIAGNOSIS — F45.8 BRUXISM (TEETH GRINDING): ICD-10-CM

## 2024-10-23 DIAGNOSIS — G47.30 SLEEP DISORDER BREATHING: Primary | ICD-10-CM

## 2024-10-23 PROCEDURE — 1159F MED LIST DOCD IN RCRD: CPT | Performed by: NURSE PRACTITIONER

## 2024-10-23 PROCEDURE — 1160F RVW MEDS BY RX/DR IN RCRD: CPT | Performed by: NURSE PRACTITIONER

## 2024-10-23 PROCEDURE — 99214 OFFICE O/P EST MOD 30 MIN: CPT | Performed by: NURSE PRACTITIONER

## 2024-10-23 PROCEDURE — 1123F ACP DISCUSS/DSCN MKR DOCD: CPT | Performed by: NURSE PRACTITIONER

## 2024-10-23 PROCEDURE — 3008F BODY MASS INDEX DOCD: CPT | Performed by: NURSE PRACTITIONER

## 2024-10-23 PROCEDURE — 1036F TOBACCO NON-USER: CPT | Performed by: NURSE PRACTITIONER

## 2024-10-23 ASSESSMENT — ENCOUNTER SYMPTOMS
OCCASIONAL FEELINGS OF UNSTEADINESS: 0
LOSS OF SENSATION IN FEET: 0
DEPRESSION: 0

## 2024-10-23 ASSESSMENT — PATIENT HEALTH QUESTIONNAIRE - PHQ9
2. FEELING DOWN, DEPRESSED OR HOPELESS: NOT AT ALL
1. LITTLE INTEREST OR PLEASURE IN DOING THINGS: NOT AT ALL
SUM OF ALL RESPONSES TO PHQ9 QUESTIONS 1 AND 2: 0

## 2024-10-23 ASSESSMENT — SLEEP AND FATIGUE QUESTIONNAIRES
HOW LIKELY ARE YOU TO NOD OFF OR FALL ASLEEP WHILE LYING DOWN TO REST IN THE AFTERNOON WHEN CIRCUMSTANCES PERMIT: MODERATE CHANCE OF DOZING
HOW LIKELY ARE YOU TO NOD OFF OR FALL ASLEEP WHILE SITTING AND TALKING TO SOMEONE: WOULD NEVER DOZE
ESS TOTAL SCORE: 11
HOW LIKELY ARE YOU TO NOD OFF OR FALL ASLEEP WHILE SITTING QUIETLY AFTER LUNCH WITHOUT ALCOHOL: SLIGHT CHANCE OF DOZING
HOW LIKELY ARE YOU TO NOD OFF OR FALL ASLEEP WHILE WATCHING TV: MODERATE CHANCE OF DOZING
HOW LIKELY ARE YOU TO NOD OFF OR FALL ASLEEP WHILE SITTING INACTIVE IN A PUBLIC PLACE: SLIGHT CHANCE OF DOZING
SITING INACTIVE IN A PUBLIC PLACE LIKE A CLASS ROOM OR A MOVIE THEATER: SLIGHT CHANCE OF DOZING
HOW LIKELY ARE YOU TO NOD OFF OR FALL ASLEEP WHILE SITTING AND READING: MODERATE CHANCE OF DOZING
HOW LIKELY ARE YOU TO NOD OFF OR FALL ASLEEP IN A CAR, WHILE STOPPED FOR A FEW MINUTES IN TRAFFIC: SLIGHT CHANCE OF DOZING
ESS-CHAD TOTAL SCORE: 10
HOW LIKELY ARE YOU TO NOD OFF OR FALL ASLEEP IN A CAR, WHILE STOPPED FOR A FEW MINUTES IN TRAFFIC: MODERATE CHANCE OF DOZING
HOW LIKELY ARE YOU TO NOD OFF OR FALL ASLEEP WHEN YOU ARE A PASSENGER IN A CAR FOR AN HOUR WITHOUT A BREAK: SLIGHT CHANCE OF DOZING

## 2024-10-23 ASSESSMENT — COLUMBIA-SUICIDE SEVERITY RATING SCALE - C-SSRS
2. HAVE YOU ACTUALLY HAD ANY THOUGHTS OF KILLING YOURSELF?: NO
1. IN THE PAST MONTH, HAVE YOU WISHED YOU WERE DEAD OR WISHED YOU COULD GO TO SLEEP AND NOT WAKE UP?: NO
6. HAVE YOU EVER DONE ANYTHING, STARTED TO DO ANYTHING, OR PREPARED TO DO ANYTHING TO END YOUR LIFE?: NO

## 2024-10-28 ENCOUNTER — APPOINTMENT (OUTPATIENT)
Dept: PHYSICAL THERAPY | Facility: CLINIC | Age: 69
End: 2024-10-28
Payer: MEDICARE

## 2024-11-04 ENCOUNTER — APPOINTMENT (OUTPATIENT)
Dept: PHYSICAL THERAPY | Facility: CLINIC | Age: 69
End: 2024-11-04
Payer: MEDICARE

## 2024-12-05 ENCOUNTER — LAB (OUTPATIENT)
Dept: LAB | Facility: LAB | Age: 69
End: 2024-12-05
Payer: MEDICARE

## 2024-12-05 DIAGNOSIS — E03.8 OTHER SPECIFIED HYPOTHYROIDISM: ICD-10-CM

## 2024-12-05 LAB
T4 FREE SERPL-MCNC: 0.93 NG/DL (ref 0.61–1.12)
TSH SERPL-ACNC: 5.57 MIU/L (ref 0.44–3.98)

## 2024-12-05 PROCEDURE — 84439 ASSAY OF FREE THYROXINE: CPT

## 2024-12-05 PROCEDURE — 84443 ASSAY THYROID STIM HORMONE: CPT

## 2024-12-05 PROCEDURE — 36415 COLL VENOUS BLD VENIPUNCTURE: CPT

## 2024-12-05 RX ORDER — LEVOTHYROXINE SODIUM 25 UG/1
50 TABLET ORAL DAILY
Qty: 60 TABLET | Refills: 11 | Status: SHIPPED | OUTPATIENT
Start: 2024-12-05 | End: 2025-12-05

## 2024-12-09 ENCOUNTER — APPOINTMENT (OUTPATIENT)
Dept: PRIMARY CARE | Facility: CLINIC | Age: 69
End: 2024-12-09
Payer: MEDICARE

## 2024-12-09 VITALS
SYSTOLIC BLOOD PRESSURE: 118 MMHG | WEIGHT: 141.4 LBS | TEMPERATURE: 97.2 F | HEART RATE: 84 BPM | OXYGEN SATURATION: 95 % | HEIGHT: 64 IN | BODY MASS INDEX: 24.14 KG/M2 | RESPIRATION RATE: 16 BRPM | DIASTOLIC BLOOD PRESSURE: 62 MMHG

## 2024-12-09 DIAGNOSIS — R91.8 MULTIPLE PULMONARY NODULES: ICD-10-CM

## 2024-12-09 DIAGNOSIS — E78.49 OTHER HYPERLIPIDEMIA: ICD-10-CM

## 2024-12-09 DIAGNOSIS — F32.0 DEPRESSION, MAJOR, SINGLE EPISODE, MILD (CMS-HCC): ICD-10-CM

## 2024-12-09 DIAGNOSIS — Z00.00 MEDICARE ANNUAL WELLNESS VISIT, SUBSEQUENT: ICD-10-CM

## 2024-12-09 DIAGNOSIS — Z12.31 ENCOUNTER FOR SCREENING MAMMOGRAM FOR MALIGNANT NEOPLASM OF BREAST: ICD-10-CM

## 2024-12-09 DIAGNOSIS — R73.9 HYPERGLYCEMIA: ICD-10-CM

## 2024-12-09 DIAGNOSIS — Z13.820 SCREENING FOR OSTEOPOROSIS: Primary | ICD-10-CM

## 2024-12-09 DIAGNOSIS — E03.8 OTHER SPECIFIED HYPOTHYROIDISM: ICD-10-CM

## 2024-12-09 DIAGNOSIS — M81.0 AGE-RELATED OSTEOPOROSIS WITHOUT CURRENT PATHOLOGICAL FRACTURE: ICD-10-CM

## 2024-12-09 PROCEDURE — 3008F BODY MASS INDEX DOCD: CPT | Performed by: INTERNAL MEDICINE

## 2024-12-09 PROCEDURE — 99213 OFFICE O/P EST LOW 20 MIN: CPT | Performed by: INTERNAL MEDICINE

## 2024-12-09 PROCEDURE — 1159F MED LIST DOCD IN RCRD: CPT | Performed by: INTERNAL MEDICINE

## 2024-12-09 PROCEDURE — 1158F ADVNC CARE PLAN TLK DOCD: CPT | Performed by: INTERNAL MEDICINE

## 2024-12-09 PROCEDURE — 1123F ACP DISCUSS/DSCN MKR DOCD: CPT | Performed by: INTERNAL MEDICINE

## 2024-12-09 PROCEDURE — 1036F TOBACCO NON-USER: CPT | Performed by: INTERNAL MEDICINE

## 2024-12-09 PROCEDURE — 1170F FXNL STATUS ASSESSED: CPT | Performed by: INTERNAL MEDICINE

## 2024-12-09 PROCEDURE — G0439 PPPS, SUBSEQ VISIT: HCPCS | Performed by: INTERNAL MEDICINE

## 2024-12-09 PROCEDURE — 1160F RVW MEDS BY RX/DR IN RCRD: CPT | Performed by: INTERNAL MEDICINE

## 2024-12-09 RX ORDER — ESTRADIOL 10 UG/1
10 INSERT VAGINAL 2 TIMES WEEKLY
COMMUNITY
Start: 2024-09-30

## 2024-12-09 ASSESSMENT — ENCOUNTER SYMPTOMS
DYSURIA: 0
SORE THROAT: 0
SHORTNESS OF BREATH: 0
ABDOMINAL PAIN: 0
CONSTIPATION: 1
NAUSEA: 0
BACK PAIN: 1
COUGH: 0
ADENOPATHY: 0
JOINT SWELLING: 0
WHEEZING: 0
VOMITING: 0
CHEST TIGHTNESS: 0
DIARRHEA: 0
CONFUSION: 0
WEAKNESS: 0
FATIGUE: 1
SLEEP DISTURBANCE: 0
DIZZINESS: 0
HEADACHES: 1
CHILLS: 0
NECK PAIN: 1
PALPITATIONS: 0
ARTHRALGIAS: 0
UNEXPECTED WEIGHT CHANGE: 0
NERVOUS/ANXIOUS: 1

## 2024-12-09 ASSESSMENT — ACTIVITIES OF DAILY LIVING (ADL)
MANAGING_FINANCES: INDEPENDENT
TAKING_MEDICATION: INDEPENDENT
GROCERY_SHOPPING: INDEPENDENT
BATHING: INDEPENDENT
DOING_HOUSEWORK: INDEPENDENT
DRESSING: INDEPENDENT

## 2024-12-09 NOTE — PROGRESS NOTES
"Subjective   Reason for Visit: Marichuy Guajardo is an 69 y.o. female here for a Medicare Wellness visit.     Past Medical, Surgical, and Family History reviewed and updated in chart.    Reviewed all medications by prescribing practitioner or clinical pharmacist (such as prescriptions, OTCs, herbal therapies and supplements) and documented in the medical record.    AWV  Labs-12.2024, patient did not start the thyroid med  yet , in the past  had side effects from levothyroxine.  Never a smoker   Colonoscopy-1.3.2023-not sure when to repeat , q3y  dr Savage  Dexa-1.31.2022  Mammogram- 2.15.2024  Pap 2024, gyn  Tsh is abnormal,  has fatigue, hair and skin issue but very worried about side effects. Discussed medication interaction.        Patient Care Team:  Helen Houston MD as PCP - General (Internal Medicine)     Review of Systems   Constitutional:  Positive for fatigue. Negative for chills and unexpected weight change.        Comment   HENT:  Negative for congestion, ear pain and sore throat.    Respiratory:  Negative for cough, chest tightness, shortness of breath and wheezing.    Cardiovascular:  Negative for palpitations and leg swelling.   Gastrointestinal:  Positive for constipation. Negative for abdominal pain, diarrhea, nausea and vomiting.   Genitourinary:  Negative for dysuria and urgency.   Musculoskeletal:  Positive for back pain and neck pain. Negative for arthralgias and joint swelling.   Skin:  Negative for rash.   Neurological:  Positive for headaches. Negative for dizziness and weakness.   Hematological:  Negative for adenopathy.   Psychiatric/Behavioral:  Negative for confusion and sleep disturbance. The patient is nervous/anxious.        Objective   Vitals:  /62 (BP Location: Left arm, Patient Position: Sitting)   Pulse 84   Temp 36.2 °C (97.2 °F)   Resp 16   Ht 1.626 m (5' 4\")   Wt 64.1 kg (141 lb 6.4 oz)   SpO2 95%   BMI 24.27 kg/m²       Physical Exam  Constitutional:       " Appearance: Normal appearance.   HENT:      Head: Normocephalic and atraumatic.   Eyes:      Pupils: Pupils are equal, round, and reactive to light.   Cardiovascular:      Rate and Rhythm: Normal rate and regular rhythm.   Pulmonary:      Effort: Pulmonary effort is normal.      Breath sounds: Normal breath sounds.   Musculoskeletal:         General: Normal range of motion.      Cervical back: Normal range of motion and neck supple.   Skin:     General: Skin is warm.   Neurological:      General: No focal deficit present.      Mental Status: She is alert and oriented to person, place, and time.   Psychiatric:         Mood and Affect: Mood normal.         Behavior: Behavior normal.       Lab Results   Component Value Date    TSH 5.57 (H) 12/05/2024       Assessment & Plan  Encounter for screening mammogram for malignant neoplasm of breast    Orders:    BI mammo bilateral screening tomosynthesis; Future    Screening for osteoporosis    Orders:    XR DEXA bone density; Future    Other specified hypothyroidism         Other hyperlipidemia    Orders:    TSH with reflex to Free T4 if abnormal; Future    Hemoglobin A1C; Future    Depression, major, single episode, mild (CMS-HCC)         Multiple pulmonary nodules  Never smoked.  Stable on ct scan.         Medicare annual wellness visit, subsequent         Age-related osteoporosis without current pathological fracture    Orders:    XR DEXA bone density; Future    Hyperglycemia    Orders:    Hemoglobin A1C; Future

## 2024-12-11 ENCOUNTER — APPOINTMENT (OUTPATIENT)
Dept: SLEEP MEDICINE | Facility: CLINIC | Age: 69
End: 2024-12-11
Payer: MEDICARE

## 2024-12-29 ENCOUNTER — APPOINTMENT (OUTPATIENT)
Dept: URGENT CARE | Age: 69
End: 2024-12-29
Payer: MEDICARE

## 2025-03-04 ENCOUNTER — PATIENT MESSAGE (OUTPATIENT)
Dept: CARDIOLOGY | Facility: CLINIC | Age: 70
End: 2025-03-04
Payer: MEDICARE

## 2025-03-04 DIAGNOSIS — E78.5 HYPERLIPIDEMIA, UNSPECIFIED HYPERLIPIDEMIA TYPE: ICD-10-CM

## 2025-03-04 RX ORDER — ATORVASTATIN CALCIUM 20 MG/1
20 TABLET, FILM COATED ORAL DAILY
Qty: 90 TABLET | Refills: 3 | Status: SHIPPED | OUTPATIENT
Start: 2025-03-04 | End: 2026-03-04

## 2025-03-27 ENCOUNTER — APPOINTMENT (OUTPATIENT)
Dept: PRIMARY CARE | Facility: CLINIC | Age: 70
End: 2025-03-27
Payer: MEDICARE

## 2025-04-03 ENCOUNTER — SPECIALTY PHARMACY (OUTPATIENT)
Dept: PHARMACY | Facility: CLINIC | Age: 70
End: 2025-04-03

## 2025-04-03 ENCOUNTER — APPOINTMENT (OUTPATIENT)
Dept: NEUROLOGY | Facility: CLINIC | Age: 70
End: 2025-04-03
Payer: MEDICARE

## 2025-04-03 ENCOUNTER — APPOINTMENT (OUTPATIENT)
Dept: CARDIOLOGY | Facility: HOSPITAL | Age: 70
End: 2025-04-03
Payer: MEDICARE

## 2025-04-03 DIAGNOSIS — M43.02 CERVICAL SPONDYLOLYSIS: ICD-10-CM

## 2025-04-03 DIAGNOSIS — M54.2 CERVICALGIA: Primary | ICD-10-CM

## 2025-04-03 DIAGNOSIS — G43.711 INTRACTABLE CHRONIC MIGRAINE WITHOUT AURA AND WITH STATUS MIGRAINOSUS: ICD-10-CM

## 2025-04-03 PROCEDURE — 1036F TOBACCO NON-USER: CPT | Performed by: STUDENT IN AN ORGANIZED HEALTH CARE EDUCATION/TRAINING PROGRAM

## 2025-04-03 PROCEDURE — 99214 OFFICE O/P EST MOD 30 MIN: CPT | Performed by: STUDENT IN AN ORGANIZED HEALTH CARE EDUCATION/TRAINING PROGRAM

## 2025-04-03 PROCEDURE — 1160F RVW MEDS BY RX/DR IN RCRD: CPT | Performed by: STUDENT IN AN ORGANIZED HEALTH CARE EDUCATION/TRAINING PROGRAM

## 2025-04-03 PROCEDURE — G2211 COMPLEX E/M VISIT ADD ON: HCPCS | Performed by: STUDENT IN AN ORGANIZED HEALTH CARE EDUCATION/TRAINING PROGRAM

## 2025-04-03 PROCEDURE — 1159F MED LIST DOCD IN RCRD: CPT | Performed by: STUDENT IN AN ORGANIZED HEALTH CARE EDUCATION/TRAINING PROGRAM

## 2025-04-03 PROCEDURE — 1123F ACP DISCUSS/DSCN MKR DOCD: CPT | Performed by: STUDENT IN AN ORGANIZED HEALTH CARE EDUCATION/TRAINING PROGRAM

## 2025-04-03 RX ORDER — TIZANIDINE 2 MG/1
2 TABLET ORAL DAILY PRN
Qty: 14 TABLET | Refills: 5 | Status: SHIPPED | OUTPATIENT
Start: 2025-04-03 | End: 2026-04-03

## 2025-04-03 NOTE — PROGRESS NOTES
[facility]   Referring: No ref. provider found  PCP: Helen Houston MD  No chief complaint on file.      Subjective   Marichuy Guajardo is a 69 y.o. year old female presenting for visit for follow-up  of migraine headaches.     She was last seen 8/29/2024.     She has a history of episodic migraine headaches. She is on riboflavin and magnesium glycinate.     She has tried TCAs in the past without relief. She takes nadolol 10mg daily PRN for tachycardia. She is currently on sertraline. Her sister tried Gepants without relief. Botox was not covered by her insurance.     She has headaches 2-3 days per week and last up to 2 days. She is waking up with headaches in the morning due to her neck and back pain. She snores, has mild sleep apnea.     For abortive therapy, she takes sumatriptan 25mg daily PRN. Takes 2 hours to relief and then the headache comes back the next day.     Triggers: osmophobia and lights     Her neck has always been painful and when she gets a migraine she has severe stiffness and has trouble looking up. She is currently doing PT without any significant improvement. She also had a left sided frozen shoulder around [place]. She saw orthopedic associates and had a cortisone shot, and a c-spine xray and was told she should see a neurosurgeon.     She is having no balance or bowel or bladder incontinence.     She has pins and needles pain on the right lateral aspect of her thigh. She had this once before 15 years ago and it resolved on her on.     Patient Active Problem List   Diagnosis    Anxiety disorder    Arteritis (CMS-[facility])    Asthma exacerbation ([facility])    Carotid artery dissection (Multi)    Cervicalgia    Colon polyp    Depression, major, single episode, mild (CMS-[facility])    Diverticulitis of colon    Hyperlipidemia    IBS (irritable bowel syndrome)    Lumbar disc disease    Lumbosacral neuritis    Migraine    Multiple pulmonary nodules    Psoriasis    Abdominal  discomfort    Adjustment disorder with anxious mood    Arthralgia    Benign paroxysmal positional vertigo    Bilateral carotid artery stenosis    Carotid dissection, bilateral (Multi)    Closed fracture of fibula    Closed fracture of lateral malleolus    Hyperglycemia    Lateral epicondylitis    Leukocytosis    Localized, primary osteoarthritis of hand    Muscle weakness    Occlusion of carotid artery    Pain of foot    Palpitations    Panic disorder without agoraphobia    Paresthesia of lower extremity    Positive antinuclear antibody    Social anxiety disorder    Tibialis tendinitis    Uterine fibroid    KIRSTEN (obstructive sleep apnea)    Neck pain on right side    Other fatigue    Other specified hypothyroidism    Excessive daytime sleepiness    Bruxism (teeth grinding)    Neck pain    Sleep disorder breathing    Medicare annual wellness visit, subsequent    Cervical spondylolysis       Objective   Neurological Exam  Mental Status  Awake, alert and oriented to person, place and time. Speech is normal.    Cranial Nerves  CN III, IV, VI: Extraocular movements intact bilaterally.  CN VII: Full and symmetric facial movement.  CN VIII: Hearing is normal.  CN IX, X: Palate elevates symmetrically    Motor   Normal muscle tone. Strength is 5/5 throughout all four extremities.    Sensory  Light touch is normal in upper and lower extremities.     Reflexes                                            Right                      Left  Brachioradialis                    2+                         2+  Biceps                                 2+                         2+  Triceps                                2+                         2+  Patellar                                2+                         2+  Achilles                                2+                         2+    Right pathological reflexes: Carolynn's absent.  Left pathological reflexes: Carolynn's absent.    Gait  Casual gait is normal including stance, stride,  and arm swing.    Physical Exam  Eyes:      Extraocular Movements: Extraocular movements intact.   Neurological:      Motor: Motor strength is normal.     Deep Tendon Reflexes:      Reflex Scores:       Tricep reflexes are 2+ on the right side and 2+ on the left side.       Bicep reflexes are 2+ on the right side and 2+ on the left side.       Brachioradialis reflexes are 2+ on the right side and 2+ on the left side.       Patellar reflexes are 2+ on the right side and 2+ on the left side.       Achilles reflexes are 2+ on the right side and 2+ on the left side.  Psychiatric:         Speech: Speech normal.       Assessment/Plan   Diagnoses and all orders for this visit:  Cervicalgia  Cervical spondylolysis  Intractable chronic migraine without aura and with status migrainosus    Chronic migraine headache without aura: headaches are 16-18 days per month. She has tried TCAs, nadolol and is currently on an SSRI without relief. Not interested in Gepants. Given co-existing cervicalgia, she is a good candidate for botox. Will start botox for migraine headaches. Continue sumatriptan 25mg daily PRN for abortive therapy, she is aware to avoid medication overuse  Cervicalgia, cervical spondylosis: continue PT. Heating pad. Trial tizanidine 2mg daily PRN and botox as above.    Follow-up in 6 months     There are no Patient Instructions on file for this visit.

## 2025-04-04 DIAGNOSIS — G43.711 INTRACTABLE CHRONIC MIGRAINE WITHOUT AURA AND WITH STATUS MIGRAINOSUS: Primary | ICD-10-CM

## 2025-04-08 ENCOUNTER — TELEPHONE (OUTPATIENT)
Dept: NEUROLOGY | Facility: CLINIC | Age: 70
End: 2025-04-08
Payer: MEDICARE

## 2025-04-08 NOTE — TELEPHONE ENCOUNTER
[facility]acy sent the following message:    I wanted to supply an update as this pt indicated she was going to reach out to the office for further assistance. Her Botox copay is 882.71 pt did not qualify for [facility] or the  due to over income threshold per pt she was upset that her primary and supplemental are not paying the whole copay. I offered her the likely to benefit option ( a new option that medicare offers she would need to call them to discuss) but she declined. I called her supp which would cover it as medical but medicare would not cover it medically but it's covered by her pharmacy benefit. I advised her to call her plan and see if the deductible had been reached as sometimes that is a factor.  I hope this makes sense Botox is hard to explain sometimes.     Is there a different treatment plan you can recommend for the patient since this option is not affordable?

## 2025-04-15 LAB — TSH SERPL-ACNC: 2.33 MIU/L (ref 0.4–4.5)

## 2025-04-21 ENCOUNTER — APPOINTMENT (OUTPATIENT)
Dept: PRIMARY CARE | Facility: CLINIC | Age: 70
End: 2025-04-21
Payer: MEDICARE

## 2025-04-21 VITALS
RESPIRATION RATE: 16 BRPM | OXYGEN SATURATION: 98 % | HEIGHT: 64 IN | TEMPERATURE: 96.8 F | SYSTOLIC BLOOD PRESSURE: 120 MMHG | BODY MASS INDEX: 23.73 KG/M2 | HEART RATE: 100 BPM | DIASTOLIC BLOOD PRESSURE: 68 MMHG | WEIGHT: 139 LBS

## 2025-04-21 DIAGNOSIS — R00.2 PALPITATIONS: ICD-10-CM

## 2025-04-21 DIAGNOSIS — G43.001 MIGRAINE WITHOUT AURA AND WITH STATUS MIGRAINOSUS, NOT INTRACTABLE: ICD-10-CM

## 2025-04-21 DIAGNOSIS — E78.49 OTHER HYPERLIPIDEMIA: ICD-10-CM

## 2025-04-21 DIAGNOSIS — D72.829 LEUKOCYTOSIS, UNSPECIFIED TYPE: ICD-10-CM

## 2025-04-21 DIAGNOSIS — F32.0 DEPRESSION, MAJOR, SINGLE EPISODE, MILD (CMS-HCC): Primary | ICD-10-CM

## 2025-04-21 DIAGNOSIS — E55.9 VITAMIN D DEFICIENCY: ICD-10-CM

## 2025-04-21 DIAGNOSIS — F06.4 ANXIETY DISORDER DUE TO KNOWN PHYSIOLOGICAL CONDITION: ICD-10-CM

## 2025-04-21 DIAGNOSIS — Z00.00 HEALTH CARE MAINTENANCE: ICD-10-CM

## 2025-04-21 PROCEDURE — 1123F ACP DISCUSS/DSCN MKR DOCD: CPT | Performed by: INTERNAL MEDICINE

## 2025-04-21 PROCEDURE — 1036F TOBACCO NON-USER: CPT | Performed by: INTERNAL MEDICINE

## 2025-04-21 PROCEDURE — 99214 OFFICE O/P EST MOD 30 MIN: CPT | Performed by: INTERNAL MEDICINE

## 2025-04-21 PROCEDURE — 1159F MED LIST DOCD IN RCRD: CPT | Performed by: INTERNAL MEDICINE

## 2025-04-21 PROCEDURE — 1160F RVW MEDS BY RX/DR IN RCRD: CPT | Performed by: INTERNAL MEDICINE

## 2025-04-21 PROCEDURE — 3008F BODY MASS INDEX DOCD: CPT | Performed by: INTERNAL MEDICINE

## 2025-04-21 RX ORDER — NADOLOL 20 MG/1
20 TABLET ORAL DAILY
Qty: 30 TABLET | Refills: 0 | Status: SHIPPED | OUTPATIENT
Start: 2025-04-21 | End: 2025-04-24 | Stop reason: SDUPTHER

## 2025-04-21 RX ORDER — FAMOTIDINE 20 MG/1
20 TABLET, FILM COATED ORAL DAILY
Qty: 30 TABLET | Refills: 3 | Status: SHIPPED | OUTPATIENT
Start: 2025-04-21

## 2025-04-21 RX ORDER — SERTRALINE HYDROCHLORIDE 25 MG/1
25 TABLET, FILM COATED ORAL DAILY
Qty: 90 TABLET | Refills: 3 | Status: SHIPPED | OUTPATIENT
Start: 2025-04-21

## 2025-04-21 ASSESSMENT — ENCOUNTER SYMPTOMS
SLEEP DISTURBANCE: 0
CONSTIPATION: 0
VOMITING: 0
FATIGUE: 0
NAUSEA: 0
UNEXPECTED WEIGHT CHANGE: 0
SHORTNESS OF BREATH: 0
PALPITATIONS: 0
CONFUSION: 0
SORE THROAT: 0
ARTHRALGIAS: 0
DYSURIA: 0
WHEEZING: 0
JOINT SWELLING: 0
WEAKNESS: 0
COUGH: 0
DIARRHEA: 0
ABDOMINAL PAIN: 0
DIZZINESS: 0
HYPERTENSION: 1
CHEST TIGHTNESS: 0
CHILLS: 0
ADENOPATHY: 0

## 2025-04-21 ASSESSMENT — PATIENT HEALTH QUESTIONNAIRE - PHQ9
SUM OF ALL RESPONSES TO PHQ9 QUESTIONS 1 AND 2: 0
1. LITTLE INTEREST OR PLEASURE IN DOING THINGS: NOT AT ALL
2. FEELING DOWN, DEPRESSED OR HOPELESS: NOT AT ALL

## 2025-04-21 NOTE — ASSESSMENT & PLAN NOTE
Orders:    TSH with reflex to Free T4 if abnormal; Future    Vitamin B12; Future    Comprehensive Metabolic Panel; Future

## 2025-04-21 NOTE — PATIENT INSTRUCTIONS
Was nice seeing you today.  Continue same medication.  Have lab work done before next appointment if labs were ordered today.  Fu in 3 month/awv  Call/ contact our office with any concerns.    If you have labs or test done and you can't see the report in your chart or you didn't hear from us in 2 weeks after test/labs done , please, call our office for reports.  Please , do not assume that they were normal.    Any test results  and questions you might have , will be discussed at next visit -- please make sure to make a follow up appt after testing if reports are abnormal or you have questions.

## 2025-04-21 NOTE — ASSESSMENT & PLAN NOTE
Has 15 migraine /month, seen by neurology, can't afford the new medication, nurtec, etc.  Taking nadolol  20 mg prn for tachycardia  Can't tolerate bb daily.

## 2025-04-21 NOTE — PROGRESS NOTES
"Subjective   Marichuy Guajardo is a 69 y.o. female who presents for Follow-up, Hyperlipidemia, Hypertension, and Hypothyroidism.      Patient presents to follow up on lab for THS done 04/14/25.  Last visit 12/09/24  Patient stated no concerns for today.  Labs and test reviewed with patient , all question answered, further evaluation, if needed , discussed.  Didn't take synthroid at all, only 12 mcg for 4 days then stopped due to got frozen shoulder.  Tsh normal now, no symptoms of hypothyroid, will just monitor          Hyperlipidemia  Pertinent negatives include no shortness of breath.   Hypertension  Pertinent negatives include no palpitations or shortness of breath.       Review of Systems   Constitutional:  Negative for chills, fatigue and unexpected weight change.        Comment   HENT:  Negative for congestion, ear pain and sore throat.    Respiratory:  Negative for cough, chest tightness, shortness of breath and wheezing.    Cardiovascular:  Negative for palpitations and leg swelling.   Gastrointestinal:  Negative for abdominal pain, constipation, diarrhea, nausea and vomiting.   Genitourinary:  Negative for dysuria and urgency.   Musculoskeletal:  Negative for arthralgias and joint swelling.   Skin:  Negative for rash.   Neurological:  Negative for dizziness and weakness.   Hematological:  Negative for adenopathy.   Psychiatric/Behavioral:  Negative for confusion and sleep disturbance.    All other systems reviewed and are negative.      Objective   /68 (BP Location: Left arm, Patient Position: Sitting, BP Cuff Size: Adult)   Pulse 100   Temp 36 °C (96.8 °F) (Temporal)   Resp 16   Ht 1.626 m (5' 4\")   Wt 63 kg (139 lb)   SpO2 98%   BMI 23.86 kg/m²       Physical Exam  Constitutional:       Appearance: Normal appearance.   HENT:      Head: Normocephalic and atraumatic.   Eyes:      Pupils: Pupils are equal, round, and reactive to light.   Cardiovascular:      Rate and Rhythm: Normal rate and " regular rhythm.   Pulmonary:      Effort: Pulmonary effort is normal.      Breath sounds: Normal breath sounds.   Musculoskeletal:         General: Normal range of motion.      Cervical back: Normal range of motion and neck supple.   Skin:     General: Skin is warm.   Neurological:      General: No focal deficit present.      Mental Status: She is alert and oriented to person, place, and time.   Psychiatric:         Mood and Affect: Mood normal.         Behavior: Behavior normal.       Lab Results   Component Value Date    TSH 2.33 04/14/2025       Assessment & Plan  Depression, major, single episode, mild (CMS-HCC)  Zoloft decreased to 25 mg, doing good         Migraine without aura and with status migrainosus, not intractable  Has 15 migraine /month, seen by neurology, can't afford the new medication, nurtec, etc.  Taking nadolol  20 mg prn for tachycardia  Can't tolerate bb daily.         Anxiety disorder due to known physiological condition    Orders:    TSH with reflex to Free T4 if abnormal; Future    Vitamin B12; Future    Comprehensive Metabolic Panel; Future    Other hyperlipidemia    Orders:    Lipid Panel; Future    Leukocytosis, unspecified type    Orders:    CBC and Auto Differential; Future    Palpitations    Orders:    TSH with reflex to Free T4 if abnormal; Future    Vitamin B12; Future    Comprehensive Metabolic Panel; Future    Health care maintenance    Orders:    CBC and Auto Differential; Future    TSH with reflex to Free T4 if abnormal; Future    Magnesium; Future    Lipid Panel; Future    Urinalysis with Reflex Microscopic; Future    Comprehensive Metabolic Panel; Future    Vitamin D deficiency    Orders:    Magnesium; Future    Vitamin D 25-Hydroxy,Total (for eval of Vitamin D levels); Future         Helen Houston MD

## 2025-04-24 DIAGNOSIS — G43.001 MIGRAINE WITHOUT AURA AND WITH STATUS MIGRAINOSUS, NOT INTRACTABLE: Primary | ICD-10-CM

## 2025-04-24 RX ORDER — NADOLOL 20 MG/1
20 TABLET ORAL DAILY
Qty: 30 TABLET | Refills: 3 | Status: SHIPPED | OUTPATIENT
Start: 2025-04-24

## 2025-08-08 ENCOUNTER — HOSPITAL ENCOUNTER (OUTPATIENT)
Dept: CARDIOLOGY | Facility: HOSPITAL | Age: 70
Discharge: HOME | End: 2025-08-08
Payer: MEDICARE

## 2025-08-08 ENCOUNTER — HOSPITAL ENCOUNTER (EMERGENCY)
Facility: HOSPITAL | Age: 70
Discharge: HOME | End: 2025-08-09
Attending: EMERGENCY MEDICINE
Payer: MEDICARE

## 2025-08-08 DIAGNOSIS — K57.32 SIGMOID DIVERTICULITIS: Primary | ICD-10-CM

## 2025-08-08 LAB
ALBUMIN SERPL BCP-MCNC: 4.8 G/DL (ref 3.4–5)
ALP SERPL-CCNC: 42 U/L (ref 33–136)
ALT SERPL W P-5'-P-CCNC: 18 U/L (ref 7–45)
ANION GAP SERPL CALC-SCNC: 14 MMOL/L (ref 10–20)
APPEARANCE UR: CLEAR
AST SERPL W P-5'-P-CCNC: 20 U/L (ref 9–39)
BASOPHILS # BLD AUTO: 0.05 X10*3/UL (ref 0–0.1)
BASOPHILS NFR BLD AUTO: 0.4 %
BILIRUB SERPL-MCNC: 0.5 MG/DL (ref 0–1.2)
BILIRUB UR STRIP.AUTO-MCNC: NEGATIVE MG/DL
BUN SERPL-MCNC: 15 MG/DL (ref 6–23)
CALCIUM SERPL-MCNC: 9.7 MG/DL (ref 8.6–10.3)
CHLORIDE SERPL-SCNC: 103 MMOL/L (ref 98–107)
CO2 SERPL-SCNC: 26 MMOL/L (ref 21–32)
COLOR UR: COLORLESS
CREAT SERPL-MCNC: 0.85 MG/DL (ref 0.5–1.05)
EGFRCR SERPLBLD CKD-EPI 2021: 75 ML/MIN/1.73M*2
EOSINOPHIL # BLD AUTO: 0.17 X10*3/UL (ref 0–0.7)
EOSINOPHIL NFR BLD AUTO: 1.3 %
ERYTHROCYTE [DISTWIDTH] IN BLOOD BY AUTOMATED COUNT: 13.1 % (ref 11.5–14.5)
GLUCOSE SERPL-MCNC: 123 MG/DL (ref 74–99)
GLUCOSE UR STRIP.AUTO-MCNC: NORMAL MG/DL
HCT VFR BLD AUTO: 41.8 % (ref 36–46)
HGB BLD-MCNC: 13.7 G/DL (ref 12–16)
IMM GRANULOCYTES # BLD AUTO: 0.04 X10*3/UL (ref 0–0.7)
IMM GRANULOCYTES NFR BLD AUTO: 0.3 % (ref 0–0.9)
KETONES UR STRIP.AUTO-MCNC: NEGATIVE MG/DL
LEUKOCYTE ESTERASE UR QL STRIP.AUTO: NEGATIVE
LYMPHOCYTES # BLD AUTO: 1.62 X10*3/UL (ref 1.2–4.8)
LYMPHOCYTES NFR BLD AUTO: 12.8 %
MCH RBC QN AUTO: 29.6 PG (ref 26–34)
MCHC RBC AUTO-ENTMCNC: 32.8 G/DL (ref 32–36)
MCV RBC AUTO: 90 FL (ref 80–100)
MONOCYTES # BLD AUTO: 0.95 X10*3/UL (ref 0.1–1)
MONOCYTES NFR BLD AUTO: 7.5 %
NEUTROPHILS # BLD AUTO: 9.83 X10*3/UL (ref 1.2–7.7)
NEUTROPHILS NFR BLD AUTO: 77.7 %
NITRITE UR QL STRIP.AUTO: NEGATIVE
NRBC BLD-RTO: 0 /100 WBCS (ref 0–0)
PH UR STRIP.AUTO: 5.5 [PH]
PLATELET # BLD AUTO: 258 X10*3/UL (ref 150–450)
POTASSIUM SERPL-SCNC: 4.1 MMOL/L (ref 3.5–5.3)
PROT SERPL-MCNC: 7.6 G/DL (ref 6.4–8.2)
PROT UR STRIP.AUTO-MCNC: NEGATIVE MG/DL
RBC # BLD AUTO: 4.63 X10*6/UL (ref 4–5.2)
RBC # UR STRIP.AUTO: NEGATIVE MG/DL
SODIUM SERPL-SCNC: 139 MMOL/L (ref 136–145)
SP GR UR STRIP.AUTO: 1.01
UROBILINOGEN UR STRIP.AUTO-MCNC: NORMAL MG/DL
WBC # BLD AUTO: 12.7 X10*3/UL (ref 4.4–11.3)

## 2025-08-08 PROCEDURE — 80053 COMPREHEN METABOLIC PANEL: CPT | Performed by: EMERGENCY MEDICINE

## 2025-08-08 PROCEDURE — 85025 COMPLETE CBC W/AUTO DIFF WBC: CPT | Performed by: EMERGENCY MEDICINE

## 2025-08-08 PROCEDURE — 99285 EMERGENCY DEPT VISIT HI MDM: CPT | Performed by: EMERGENCY MEDICINE

## 2025-08-08 PROCEDURE — 81003 URINALYSIS AUTO W/O SCOPE: CPT | Performed by: EMERGENCY MEDICINE

## 2025-08-08 PROCEDURE — 36415 COLL VENOUS BLD VENIPUNCTURE: CPT | Performed by: EMERGENCY MEDICINE

## 2025-08-08 PROCEDURE — 93005 ELECTROCARDIOGRAM TRACING: CPT

## 2025-08-08 RX ORDER — ACETAMINOPHEN 325 MG/1
975 TABLET ORAL ONCE
Status: COMPLETED | OUTPATIENT
Start: 2025-08-08 | End: 2025-08-09

## 2025-08-08 ASSESSMENT — PAIN DESCRIPTION - DESCRIPTORS
DESCRIPTORS: CRAMPING
DESCRIPTORS_2: ACHING

## 2025-08-08 ASSESSMENT — PAIN DESCRIPTION - FREQUENCY: FREQUENCY: CONSTANT/CONTINUOUS

## 2025-08-08 ASSESSMENT — PAIN SCALES - GENERAL
PAINLEVEL_OUTOF10: 6
PAINLEVEL_OUTOF10: 7

## 2025-08-08 ASSESSMENT — PAIN DESCRIPTION - ONSET: ONSET: SUDDEN

## 2025-08-08 ASSESSMENT — PAIN DESCRIPTION - LOCATION
LOCATION: ABDOMEN
LOCATION_2: BACK

## 2025-08-08 ASSESSMENT — PAIN - FUNCTIONAL ASSESSMENT: PAIN_FUNCTIONAL_ASSESSMENT: 0-10

## 2025-08-08 ASSESSMENT — PAIN DESCRIPTION - ORIENTATION
ORIENTATION: LOWER
ORIENTATION_2: LOWER

## 2025-08-08 ASSESSMENT — PAIN DESCRIPTION - PROGRESSION: CLINICAL_PROGRESSION: NOT CHANGED

## 2025-08-08 ASSESSMENT — PAIN DESCRIPTION - PAIN TYPE: TYPE: ACUTE PAIN

## 2025-08-09 ENCOUNTER — APPOINTMENT (OUTPATIENT)
Dept: RADIOLOGY | Facility: HOSPITAL | Age: 70
End: 2025-08-09
Payer: MEDICARE

## 2025-08-09 VITALS
WEIGHT: 135 LBS | TEMPERATURE: 97.7 F | RESPIRATION RATE: 19 BRPM | HEIGHT: 64 IN | HEART RATE: 95 BPM | BODY MASS INDEX: 23.05 KG/M2 | DIASTOLIC BLOOD PRESSURE: 73 MMHG | SYSTOLIC BLOOD PRESSURE: 139 MMHG | OXYGEN SATURATION: 97 %

## 2025-08-09 PROCEDURE — 2500000001 HC RX 250 WO HCPCS SELF ADMINISTERED DRUGS (ALT 637 FOR MEDICARE OP)

## 2025-08-09 PROCEDURE — 74177 CT ABD & PELVIS W/CONTRAST: CPT

## 2025-08-09 PROCEDURE — 74177 CT ABD & PELVIS W/CONTRAST: CPT | Performed by: RADIOLOGY

## 2025-08-09 PROCEDURE — 2550000001 HC RX 255 CONTRASTS: Performed by: EMERGENCY MEDICINE

## 2025-08-09 PROCEDURE — 2500000004 HC RX 250 GENERAL PHARMACY W/ HCPCS (ALT 636 FOR OP/ED)

## 2025-08-09 RX ORDER — ONDANSETRON 4 MG/1
4 TABLET, FILM COATED ORAL EVERY 6 HOURS
Qty: 12 TABLET | Refills: 0 | Status: SHIPPED | OUTPATIENT
Start: 2025-08-09 | End: 2025-08-12

## 2025-08-09 RX ORDER — ONDANSETRON HYDROCHLORIDE 2 MG/ML
4 INJECTION, SOLUTION INTRAVENOUS ONCE
Status: DISCONTINUED | OUTPATIENT
Start: 2025-08-09 | End: 2025-08-09 | Stop reason: HOSPADM

## 2025-08-09 RX ORDER — AMOXICILLIN AND CLAVULANATE POTASSIUM 875; 125 MG/1; MG/1
1 TABLET, FILM COATED ORAL EVERY 12 HOURS
Qty: 20 TABLET | Refills: 0 | Status: SHIPPED | OUTPATIENT
Start: 2025-08-09 | End: 2025-08-19

## 2025-08-09 RX ORDER — AMOXICILLIN AND CLAVULANATE POTASSIUM 875; 125 MG/1; MG/1
1 TABLET, FILM COATED ORAL ONCE
Status: COMPLETED | OUTPATIENT
Start: 2025-08-09 | End: 2025-08-09

## 2025-08-09 RX ADMIN — IOHEXOL 75 ML: 350 INJECTION, SOLUTION INTRAVENOUS at 00:03

## 2025-08-09 RX ADMIN — ACETAMINOPHEN 975 MG: 325 TABLET ORAL at 00:52

## 2025-08-09 RX ADMIN — AMOXICILLIN AND CLAVULANATE POTASSIUM 1 TABLET: 875; 125 TABLET, FILM COATED ORAL at 00:53

## 2025-08-09 ASSESSMENT — PAIN - FUNCTIONAL ASSESSMENT: PAIN_FUNCTIONAL_ASSESSMENT: 0-10

## 2025-08-09 ASSESSMENT — PAIN SCALES - GENERAL: PAINLEVEL_OUTOF10: 4

## 2025-08-09 NOTE — DISCHARGE INSTRUCTIONS
"You were seen in the ED for abdominal pain and were found to have sigmoid diverticulitis. Please utilize a liquid diet for 3 days or until symptoms resolve and soft, and tylenol (acetaminophen) as needed.  As discussed, you may also use Zofran as needed for nausea.  We discussed using antibiotics to treat this problem, please complete the entire course of your antibiotic with food as discussed.    Please seek immediate medical attention should you feel worse in any way or have any of the following symptoms: increasing or different abdominal pain, abdominal distension (bloating), persistent vomiting, blood in stool, fevers of 38C (100.4) or higher, shaking chills, lightheadedness, confusion, yellowish skin, or itchiness. Please return to the emergency department for a recheck in 8-12 hours if the pain is persistent or worse so we can re-evaluate you and ensure that you are not developing a problem that would require surgery or hospitalization.\"  "

## 2025-08-09 NOTE — ED PROVIDER NOTES
Emergency Department Provider Note       EMERGENCY DEPARTMENT ENCOUNTER      Pt Name: Marichuy Guajardo  MRN: [id number]  Birthdate 1955  Date of evaluation: 8/8/2025  Provider: Tomas Holder DO    CHIEF COMPLAINT       Chief Complaint   Patient presents with    Abdominal Pain     Pt presents to ED with c/o diffuse lower abdominal and lower back pain , constipation x1 day     Back Pain    Constipation         HISTORY OF PRESENT ILLNESS    This patient is a 69-year-old female who presents to the emergency department with a complaint of abdominal pain, constipation, and back pain.  She states that this pain began at about 5 AM this morning and has not changed since.  She reports lower abdominal bloating and cramping in a belt-like distribution as well as a achy pain in her mid low back.  She also reports intermittent nausea and small-volume stool today.  She has otherwise felt normal.  She denies fevers, chills, headache, dizziness, chest pain, shortness of breath, diarrhea, vomiting, recent illness, change in urinary frequency, dysuria.  She denies any surgical history on the abdomen.  She does note that she had a colonoscopy 3 weeks ago in which 20 polyps were found and according to the patient all of these were benign.          Nursing Notes were reviewed.    PAST MEDICAL HISTORY   Medical History[1]      SURGICAL HISTORY     Surgical History[2]      CURRENT MEDICATIONS       Discharge Medication List as of 8/9/2025 12:58 AM        CONTINUE these medications which have NOT CHANGED    Details   atorvastatin (Lipitor) 20 mg tablet Take 1 tablet (20 mg) by mouth once daily., Starting Tue 3/4/2025, Until Wed 3/4/2026, Normal      cetirizine (ZyrTEC) 10 mg tablet Take 1 tablet (10 mg) by mouth once daily., Historical Med      clobetasol (Temovate) 0.05 % external solution Apply 1 Application topically 2 times a day. APPLY AND GENTLY MASSAGE INTO AFFECTED AREA(S), Starting Wed 9/9/2020, Historical Med       clonazePAM (KlonoPIN) 0.5 mg tablet Take 1 tablet (0.5 mg) by mouth once daily., Starting Fri 12/22/2023, Normal      estradiol (Vagifem) 10 mcg tablet vaginal tablet Insert 1 tablet (10 mcg) into the vagina 2 times a week., Starting Mon 9/30/2024, Historical Med      famotidine (Pepcid) 20 mg tablet Take 1 tablet (20 mg) by mouth once daily., Starting Mon 4/21/2025, Fill Later      multivitamin tablet Take 1 tablet by mouth once daily., Historical Med      nadolol (Corgard) 20 mg tablet Take 1 tablet (20 mg) by mouth once daily. States takes 5 m-10 mg once a week, Starting Thu 4/24/2025, Normal      sertraline (Zoloft) 25 mg tablet Take 1 tablet (25 mg) by mouth once daily. 25mg  QD- per specialist dr order-12.9.24, Starting Mon 4/21/2025, Fill Later      SUMAtriptan (Imitrex) 25 mg tablet Take 1 tablet (25 mg) by mouth once daily as needed for migraine. May repeat dose once in 2 hours if no relief.  Do not exceed 2 doses in 24 hours., Starting Thu 8/29/2024, Until Fri 8/29/2025 at 2359, Normal      tiZANidine (Zanaflex) 2 mg tablet Take 1 tablet (2 mg) by mouth once daily as needed for muscle spasms., Starting Thu 4/3/2025, Until Fri 4/3/2026 at 2359, Normal             ALLERGIES     Ciprofloxacin, Codeine, Other, Statins-hmg-coa reductase inhibitors, Sulfa (sulfonamide antibiotics), and Pantoprazole    FAMILY HISTORY     Family History[3]       SOCIAL HISTORY     Social History[4]    SCREENINGS                        PHYSICAL EXAM    (up to 7 for level 4, 8 or more for level 5)     ED Triage Vitals [08/08/25 2208]   Temperature Heart Rate Respirations BP   36.8 °C (98.2 °F) (!) 125 16 (!) 150/96      Pulse Ox Temp Source Heart Rate Source Patient Position   97 % Temporal Monitor Sitting      BP Location FiO2 (%)     Right arm --       Physical Exam  Constitutional:       General: She is not in acute distress.     Appearance: She is not ill-appearing, toxic-appearing or diaphoretic.   HENT:      Head:  Normocephalic and atraumatic.     Eyes:      Extraocular Movements: Extraocular movements intact.       Cardiovascular:      Rate and Rhythm: Regular rhythm. Tachycardia present.      Heart sounds: Normal heart sounds.   Pulmonary:      Effort: Pulmonary effort is normal.      Breath sounds: Normal breath sounds.   Abdominal:      General: Abdomen is flat. Bowel sounds are normal. There is no distension.      Palpations: Abdomen is soft. There is no fluid wave.      Tenderness: There is abdominal tenderness in the right lower quadrant and left lower quadrant. There is no guarding or rebound.   Genitourinary:     Rectum: Normal.     Skin:     General: Skin is warm and dry.     Neurological:      General: No focal deficit present.      Mental Status: She is alert and oriented to person, place, and time.     Psychiatric:         Mood and Affect: Mood normal.         Behavior: Behavior normal.          DIAGNOSTIC RESULTS     LABS:  Labs Reviewed   CBC WITH AUTO DIFFERENTIAL - Abnormal       Result Value    WBC 12.7 (*)     nRBC 0.0      RBC 4.63      Hemoglobin 13.7      Hematocrit 41.8      MCV 90      MCH 29.6      MCHC 32.8      RDW 13.1      Platelets 258      Neutrophils % 77.7      Immature Granulocytes %, Automated 0.3      Lymphocytes % 12.8      Monocytes % 7.5      Eosinophils % 1.3      Basophils % 0.4      Neutrophils Absolute 9.83 (*)     Immature Granulocytes Absolute, Automated 0.04      Lymphocytes Absolute 1.62      Monocytes Absolute 0.95      Eosinophils Absolute 0.17      Basophils Absolute 0.05     COMPREHENSIVE METABOLIC PANEL - Abnormal    Glucose 123 (*)     Sodium 139      Potassium 4.1      Chloride 103      Bicarbonate 26      Anion Gap 14      Urea Nitrogen 15      Creatinine 0.85      eGFR 75      Calcium 9.7      Albumin 4.8      Alkaline Phosphatase 42      Total Protein 7.6      AST 20      Bilirubin, Total 0.5      ALT 18     URINALYSIS WITH REFLEX CULTURE AND MICROSCOPIC - Abnormal     Color, Urine Colorless (*)     Appearance, Urine Clear      Specific Gravity, Urine 1.007      pH, Urine 5.5      Protein, Urine NEGATIVE      Glucose, Urine Normal      Blood, Urine NEGATIVE      Ketones, Urine NEGATIVE      Bilirubin, Urine NEGATIVE      Urobilinogen, Urine Normal      Nitrite, Urine NEGATIVE      Leukocyte Esterase, Urine NEGATIVE     URINALYSIS WITH REFLEX CULTURE AND MICROSCOPIC    Narrative:     The following orders were created for panel order Urinalysis with Reflex Culture and Microscopic.  Procedure                               Abnormality         Status                     ---------                               -----------         ------                     Urinalysis with Reflex C...[[id number]]  Abnormal            Final result               Extra Urine Gray Tube[[id number]]                            In process                   Please view results for these tests on the individual orders.   EXTRA URINE GRAY TUBE       All other labs were within normal range or not returned as of this dictation.    Imaging  CT abdomen pelvis w IV contrast   Final Result   Acute sigmoid diverticulitis.             MACRO:   None.        Signed by: Evan Finkelstein 8/9/2025 12:27 AM   Dictation workstation:   [id number]           Procedures  Procedures     EMERGENCY DEPARTMENT COURSE/MDM:   Medical Decision Making  This patient is a 69-year-old female who presents the emergency department with a 1 day history of abdominal cramping, constipation, and low back pain.  She reports having nausea but denies any vomiting.  On exam, she is tachycardic, afebrile and hemodynamically stable.  She has tenderness to palpation of the right and left lower quadrants.  She has a mild leukocytosis, CMP and urinalysis are unremarkable.  Rectal exam was performed on this patient, no stool was apparent in the rectal vault and no masses were palpated, no blood noted.  Patient will undergo CT abdomen pelvis for further  evaluation.        Please see ED course for additional MDM.    ED Course as of 08/10/25 0422   Sat Aug 09, 2025   0044 CT abdomen pelvis w IV contrast  Acute sigmoid diverticulitis.  Results were discussed with the patient.  She feels comfortable managing this at home with antibiotics, Zofran, and a liquid diet.  As she is hemodynamically stable and afebrile I agree that outpatient management is appropriate and this patient is stable for discharge.  Return precautions were discussed with the patient (new or worsening pain, intractable nausea or vomiting, persistent fevers, or any other new or worsening concerns) [CD]      ED Course User Index  [CD] Darshan Bland DO         Diagnoses as of 08/10/25 0422   Sigmoid diverticulitis        CT abdomen pelvis w IV contrast   Final Result   Acute sigmoid diverticulitis.             MACRO:   None.        Signed by: Evan Finkelstein 8/9/2025 12:27 AM   Dictation workstation:   [id number]          Patient and or family in agreement and understanding of treatment plan.  All questions answered.      I reviewed the case with the attending ED physician. The attending ED physician agrees with the plan. Patient and/or patient´s representative was counseled regarding labs, imaging, likely diagnosis, and plan. All questions were answered.    ED Medications administered this visit:    Medications   acetaminophen (Tylenol) tablet 975 mg (975 mg oral Given 8/9/25 0052)   iohexol (OMNIPaque) 350 mg iodine/mL solution 75 mL (75 mL intravenous Given 8/9/25 0003)   amoxicillin-clavulanate (Augmentin) 875-125 mg per tablet 1 tablet (1 tablet oral Given 8/9/25 0053)       New Prescriptions from this visit:    Discharge Medication List as of 8/9/2025 12:58 AM        START taking these medications    Details   amoxicillin-clavulanate (Augmentin) 875-125 mg tablet Take 1 tablet by mouth every 12 hours for 10 days., Starting Sat 8/9/2025, Until Tue 8/19/2025, Normal      ondansetron (Zofran) 4 mg  tablet Take 1 tablet (4 mg) by mouth every 6 hours for 3 days., Starting Sat 8/9/2025, Until Tue 8/12/2025, Normal             Follow-up:  No follow-up provider specified.      Final Impression:   1. Sigmoid diverticulitis          (Please note that portions of this note were completed with a voice recognition program.  Efforts were made to edit the dictations but occasionally words are mis-transcribed.)            The patient was seen by the resident/fellow.  I have personally performed a substantive portion of the encounter.  I have seen and examined the patient; agree with the workup, evaluation, MDM, management and diagnosis.  The care plan has been discussed with the resident; I have reviewed the resident’s note and agree with the documented findings.                                                                                                                       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   [2]   Past Surgical History:  Procedure Laterality Date    NOSE SURGERY  02/25/2016    Nose Surgery    OTHER SURGICAL HISTORY  09/12/2019    Tonsillectomy    OTHER SURGICAL HISTORY  09/12/2019    Dilation and curettage   [3]   Family History  Problem Relation Name Age of Onset    Asthma Mother      Other (Cardiac disorder) Mother      Lung cancer Mother      Colon cancer Father      Fibromyalgia Sister      Scoliosis Sister     [4]   Social History  Socioeconomic History    Marital status:    Tobacco Use    Smoking status: Never    Smokeless tobacco: Never   Vaping Use    Vaping status: Never Used   Substance and Sexual Activity    Alcohol use: Yes    Drug use: Never        Tomas Holder,   08/10/25 0423

## 2025-08-11 LAB — HOLD SPECIMEN: NORMAL

## 2025-08-14 LAB
ATRIAL RATE: 120 BPM
P AXIS: 75 DEGREES
P OFFSET: 210 MS
P ONSET: 141 MS
PR INTERVAL: 168 MS
Q ONSET: 225 MS
QRS COUNT: 20 BEATS
QRS DURATION: 72 MS
QT INTERVAL: 318 MS
QTC CALCULATION(BAZETT): 449 MS
QTC FREDERICIA: 400 MS
R AXIS: 114 DEGREES
T AXIS: 45 DEGREES
T OFFSET: 384 MS
VENTRICULAR RATE: 120 BPM

## 2025-08-19 ENCOUNTER — APPOINTMENT (OUTPATIENT)
Dept: CARDIOLOGY | Facility: HOSPITAL | Age: 70
End: 2025-08-19
Payer: MEDICARE

## 2025-08-19 LAB
25(OH)D3+25(OH)D2 SERPL-MCNC: 74 NG/ML (ref 30–100)
ALBUMIN SERPL-MCNC: 4 G/DL (ref 3.6–5.1)
ALP SERPL-CCNC: 35 U/L (ref 37–153)
ALT SERPL-CCNC: 13 U/L (ref 6–29)
ANION GAP SERPL CALCULATED.4IONS-SCNC: 7 MMOL/L (CALC) (ref 7–17)
APPEARANCE UR: CLEAR
AST SERPL-CCNC: 16 U/L (ref 10–35)
BACTERIA #/AREA URNS HPF: ABNORMAL /HPF
BASOPHILS # BLD AUTO: 67 CELLS/UL (ref 0–200)
BASOPHILS NFR BLD AUTO: 1.1 %
BILIRUB SERPL-MCNC: 0.2 MG/DL (ref 0.2–1.2)
BILIRUB UR QL STRIP: NEGATIVE
BUN SERPL-MCNC: 17 MG/DL (ref 7–25)
CALCIUM SERPL-MCNC: 9.2 MG/DL (ref 8.6–10.4)
CHLORIDE SERPL-SCNC: 106 MMOL/L (ref 98–110)
CHOLEST SERPL-MCNC: 152 MG/DL
CHOLEST/HDLC SERPL: 3.6 (CALC)
CO2 SERPL-SCNC: 30 MMOL/L (ref 20–32)
COLOR UR: YELLOW
CREAT SERPL-MCNC: 0.82 MG/DL (ref 0.5–1.05)
EGFRCR SERPLBLD CKD-EPI 2021: 78 ML/MIN/1.73M2
EOSINOPHIL # BLD AUTO: 451 CELLS/UL (ref 15–500)
EOSINOPHIL NFR BLD AUTO: 7.4 %
ERYTHROCYTE [DISTWIDTH] IN BLOOD BY AUTOMATED COUNT: 13.2 % (ref 11–15)
GLUCOSE SERPL-MCNC: 106 MG/DL (ref 65–99)
GLUCOSE UR QL STRIP: NEGATIVE
HCT VFR BLD AUTO: 42.1 % (ref 35–45)
HDLC SERPL-MCNC: 42 MG/DL
HGB BLD-MCNC: 13.1 G/DL (ref 11.7–15.5)
HGB UR QL STRIP: NEGATIVE
HYALINE CASTS #/AREA URNS LPF: ABNORMAL /LPF
KETONES UR QL STRIP: NEGATIVE
LDLC SERPL CALC-MCNC: 90 MG/DL (CALC)
LEUKOCYTE ESTERASE UR QL STRIP: ABNORMAL
LYMPHOCYTES # BLD AUTO: 2202 CELLS/UL (ref 850–3900)
LYMPHOCYTES NFR BLD AUTO: 36.1 %
MAGNESIUM SERPL-MCNC: 2.3 MG/DL (ref 1.5–2.5)
MCH RBC QN AUTO: 29.2 PG (ref 27–33)
MCHC RBC AUTO-ENTMCNC: 31.1 G/DL (ref 32–36)
MCV RBC AUTO: 93.8 FL (ref 80–100)
MONOCYTES # BLD AUTO: 531 CELLS/UL (ref 200–950)
MONOCYTES NFR BLD AUTO: 8.7 %
NEUTROPHILS # BLD AUTO: 2849 CELLS/UL (ref 1500–7800)
NEUTROPHILS NFR BLD AUTO: 46.7 %
NITRITE UR QL STRIP: NEGATIVE
NONHDLC SERPL-MCNC: 110 MG/DL (CALC)
PH UR STRIP: 5.5 [PH] (ref 5–8)
PLATELET # BLD AUTO: 343 THOUSAND/UL (ref 140–400)
PMV BLD REES-ECKER: 10.1 FL (ref 7.5–12.5)
POTASSIUM SERPL-SCNC: 4.2 MMOL/L (ref 3.5–5.3)
PROT SERPL-MCNC: 6.4 G/DL (ref 6.1–8.1)
PROT UR QL STRIP: NEGATIVE
RBC # BLD AUTO: 4.49 MILLION/UL (ref 3.8–5.1)
RBC #/AREA URNS HPF: ABNORMAL /HPF
SERVICE CMNT-IMP: ABNORMAL
SODIUM SERPL-SCNC: 143 MMOL/L (ref 135–146)
SP GR UR STRIP: 1.02 (ref 1–1.03)
SQUAMOUS #/AREA URNS HPF: ABNORMAL /HPF
TRIGL SERPL-MCNC: 100 MG/DL
TSH SERPL-ACNC: 2.68 MIU/L (ref 0.4–4.5)
VIT B12 SERPL-MCNC: 857 PG/ML (ref 200–1100)
WBC # BLD AUTO: 6.1 THOUSAND/UL (ref 3.8–10.8)
WBC #/AREA URNS HPF: ABNORMAL /HPF

## 2025-08-20 ENCOUNTER — APPOINTMENT (OUTPATIENT)
Dept: PRIMARY CARE | Facility: CLINIC | Age: 70
End: 2025-08-20
Payer: MEDICARE

## 2025-08-20 VITALS
WEIGHT: 132.2 LBS | BODY MASS INDEX: 22.69 KG/M2 | OXYGEN SATURATION: 96 % | HEART RATE: 79 BPM | TEMPERATURE: 97.3 F | DIASTOLIC BLOOD PRESSURE: 62 MMHG | SYSTOLIC BLOOD PRESSURE: 120 MMHG | RESPIRATION RATE: 16 BRPM

## 2025-08-20 DIAGNOSIS — I77.71 CAROTID DISSECTION, BILATERAL (MULTI): ICD-10-CM

## 2025-08-20 DIAGNOSIS — D12.2 ADENOMATOUS POLYP OF ASCENDING COLON: ICD-10-CM

## 2025-08-20 DIAGNOSIS — E78.49 OTHER HYPERLIPIDEMIA: Primary | ICD-10-CM

## 2025-08-20 DIAGNOSIS — Z09 HOSPITAL DISCHARGE FOLLOW-UP: ICD-10-CM

## 2025-08-20 DIAGNOSIS — K57.92 DIVERTICULITIS: ICD-10-CM

## 2025-08-20 PROBLEM — G57.11 MERALGIA PARESTHETICA OF RIGHT SIDE: Status: ACTIVE | Noted: 2025-05-06

## 2025-08-20 PROCEDURE — 99214 OFFICE O/P EST MOD 30 MIN: CPT | Performed by: INTERNAL MEDICINE

## 2025-08-20 PROCEDURE — 1036F TOBACCO NON-USER: CPT | Performed by: INTERNAL MEDICINE

## 2025-08-20 PROCEDURE — 1159F MED LIST DOCD IN RCRD: CPT | Performed by: INTERNAL MEDICINE

## 2025-08-20 PROCEDURE — 1160F RVW MEDS BY RX/DR IN RCRD: CPT | Performed by: INTERNAL MEDICINE

## 2025-08-20 ASSESSMENT — ENCOUNTER SYMPTOMS
NAUSEA: 0
SORE THROAT: 0
SHORTNESS OF BREATH: 0
DIZZINESS: 0
ARTHRALGIAS: 0
FATIGUE: 0
CHILLS: 0
WEAKNESS: 0
SLEEP DISTURBANCE: 0
ABDOMINAL PAIN: 0
ADENOPATHY: 0
VOMITING: 0
CONSTIPATION: 0
PALPITATIONS: 0
UNEXPECTED WEIGHT CHANGE: 0
JOINT SWELLING: 0
WHEEZING: 0
DYSURIA: 0
COUGH: 0
CHEST TIGHTNESS: 0
CONFUSION: 0
DIARRHEA: 0

## 2025-08-21 ENCOUNTER — TELEPHONE (OUTPATIENT)
Dept: PRIMARY CARE | Facility: CLINIC | Age: 70
End: 2025-08-21
Payer: MEDICARE

## 2025-11-20 ENCOUNTER — APPOINTMENT (OUTPATIENT)
Dept: NEUROLOGY | Facility: CLINIC | Age: 70
End: 2025-11-20
Payer: MEDICARE

## 2025-12-11 ENCOUNTER — APPOINTMENT (OUTPATIENT)
Dept: PRIMARY CARE | Facility: CLINIC | Age: 70
End: 2025-12-11
Payer: MEDICARE